# Patient Record
Sex: FEMALE | Race: WHITE | Employment: FULL TIME | URBAN - METROPOLITAN AREA
[De-identification: names, ages, dates, MRNs, and addresses within clinical notes are randomized per-mention and may not be internally consistent; named-entity substitution may affect disease eponyms.]

---

## 2017-11-19 PROBLEM — B97.7 HIGH RISK HUMAN PAPILLOMA VIRUS (HPV) INFECTION OF CERVIX: Status: ACTIVE | Noted: 2017-11-19

## 2017-11-19 PROBLEM — R87.612 LGSIL ON PAP SMEAR OF CERVIX: Status: ACTIVE | Noted: 2017-11-19

## 2017-11-19 PROBLEM — N72 HIGH RISK HUMAN PAPILLOMA VIRUS (HPV) INFECTION OF CERVIX: Status: ACTIVE | Noted: 2017-11-19

## 2023-02-15 ENCOUNTER — APPOINTMENT (OUTPATIENT)
Dept: ULTRASOUND IMAGING | Age: 28
End: 2023-02-15
Payer: COMMERCIAL

## 2023-02-15 ENCOUNTER — HOSPITAL ENCOUNTER (EMERGENCY)
Age: 28
Discharge: HOME | End: 2023-02-16
Attending: EMERGENCY MEDICINE
Payer: COMMERCIAL

## 2023-02-15 VITALS
OXYGEN SATURATION: 100 % | SYSTOLIC BLOOD PRESSURE: 123 MMHG | DIASTOLIC BLOOD PRESSURE: 71 MMHG | TEMPERATURE: 98.3 F | HEART RATE: 85 BPM

## 2023-02-15 DIAGNOSIS — O03.9 MISCARRIAGE: Primary | ICD-10-CM

## 2023-02-15 LAB
ABO/RH: NORMAL
ALBUMIN SERPL-MCNC: 4.2 G/DL (ref 3.5–5.2)
ALP BLD-CCNC: 60 U/L (ref 35–104)
ALT SERPL-CCNC: 49 U/L (ref 0–32)
ANION GAP SERPL CALCULATED.3IONS-SCNC: 11 MMOL/L (ref 7–16)
AST SERPL-CCNC: 26 U/L (ref 0–31)
BACTERIA: ABNORMAL /HPF
BASOPHILS ABSOLUTE: 0.03 E9/L (ref 0–0.2)
BASOPHILS RELATIVE PERCENT: 0.3 % (ref 0–2)
BILIRUB SERPL-MCNC: 0.3 MG/DL (ref 0–1.2)
BILIRUBIN URINE: NEGATIVE
BLOOD, URINE: ABNORMAL
BUN BLDV-MCNC: 9 MG/DL (ref 6–20)
CALCIUM SERPL-MCNC: 9.2 MG/DL (ref 8.6–10.2)
CHLORIDE BLD-SCNC: 100 MMOL/L (ref 98–107)
CLARITY: ABNORMAL
CO2: 25 MMOL/L (ref 22–29)
COLOR: ABNORMAL
CREAT SERPL-MCNC: 0.8 MG/DL (ref 0.5–1)
EOSINOPHILS ABSOLUTE: 0.19 E9/L (ref 0.05–0.5)
EOSINOPHILS RELATIVE PERCENT: 1.9 % (ref 0–6)
EPITHELIAL CELLS, UA: ABNORMAL /HPF
GFR SERPL CREATININE-BSD FRML MDRD: >60 ML/MIN/1.73
GLUCOSE BLD-MCNC: 113 MG/DL (ref 74–99)
GLUCOSE URINE: NEGATIVE MG/DL
GONADOTROPIN, CHORIONIC (HCG) QUANT: 4129 MIU/ML
HCG, URINE, POC: POSITIVE
HCT VFR BLD CALC: 42.1 % (ref 34–48)
HEMOGLOBIN: 13.4 G/DL (ref 11.5–15.5)
IMMATURE GRANULOCYTES #: 0.03 E9/L
IMMATURE GRANULOCYTES %: 0.3 % (ref 0–5)
KETONES, URINE: NEGATIVE MG/DL
LEUKOCYTE ESTERASE, URINE: NEGATIVE
LYMPHOCYTES ABSOLUTE: 1.78 E9/L (ref 1.5–4)
LYMPHOCYTES RELATIVE PERCENT: 18.1 % (ref 20–42)
Lab: ABNORMAL
MCH RBC QN AUTO: 28 PG (ref 26–35)
MCHC RBC AUTO-ENTMCNC: 31.8 % (ref 32–34.5)
MCV RBC AUTO: 88.1 FL (ref 80–99.9)
MONOCYTES ABSOLUTE: 0.58 E9/L (ref 0.1–0.95)
MONOCYTES RELATIVE PERCENT: 5.9 % (ref 2–12)
NEGATIVE QC PASS/FAIL: ABNORMAL
NEUTROPHILS ABSOLUTE: 7.2 E9/L (ref 1.8–7.3)
NEUTROPHILS RELATIVE PERCENT: 73.5 % (ref 43–80)
NITRITE, URINE: POSITIVE
PDW BLD-RTO: 12.1 FL (ref 11.5–15)
PH UA: 5 (ref 5–9)
PLATELET # BLD: 331 E9/L (ref 130–450)
PMV BLD AUTO: 9.6 FL (ref 7–12)
POSITIVE QC PASS/FAIL: ABNORMAL
POTASSIUM SERPL-SCNC: 3.8 MMOL/L (ref 3.5–5)
PROTEIN UA: 30 MG/DL
RBC # BLD: 4.78 E12/L (ref 3.5–5.5)
RBC UA: >20 /HPF (ref 0–2)
SODIUM BLD-SCNC: 136 MMOL/L (ref 132–146)
SPECIFIC GRAVITY UA: 1.02 (ref 1–1.03)
TOTAL PROTEIN: 7.1 G/DL (ref 6.4–8.3)
UROBILINOGEN, URINE: 0.2 E.U./DL
WBC # BLD: 9.8 E9/L (ref 4.5–11.5)
WBC UA: ABNORMAL /HPF (ref 0–5)

## 2023-02-15 PROCEDURE — 76817 TRANSVAGINAL US OBSTETRIC: CPT

## 2023-02-15 PROCEDURE — 86901 BLOOD TYPING SEROLOGIC RH(D): CPT

## 2023-02-15 PROCEDURE — 80053 COMPREHEN METABOLIC PANEL: CPT

## 2023-02-15 PROCEDURE — 99284 EMERGENCY DEPT VISIT MOD MDM: CPT

## 2023-02-15 PROCEDURE — 81001 URINALYSIS AUTO W/SCOPE: CPT

## 2023-02-15 PROCEDURE — 36415 COLL VENOUS BLD VENIPUNCTURE: CPT

## 2023-02-15 PROCEDURE — 84702 CHORIONIC GONADOTROPIN TEST: CPT

## 2023-02-15 PROCEDURE — 86900 BLOOD TYPING SEROLOGIC ABO: CPT

## 2023-02-15 PROCEDURE — 85025 COMPLETE CBC W/AUTO DIFF WBC: CPT

## 2023-02-15 ASSESSMENT — ENCOUNTER SYMPTOMS
VOMITING: 0
WHEEZING: 0
ABDOMINAL DISTENTION: 0
DIARRHEA: 0
EYE DISCHARGE: 0
EYE REDNESS: 0
ABDOMINAL PAIN: 1
SORE THROAT: 0
COUGH: 0
NAUSEA: 0
EYE PAIN: 0
SHORTNESS OF BREATH: 0
BACK PAIN: 0
SINUS PRESSURE: 0

## 2023-02-15 ASSESSMENT — PAIN DESCRIPTION - DESCRIPTORS: DESCRIPTORS: CRAMPING

## 2023-02-15 ASSESSMENT — PAIN DESCRIPTION - LOCATION: LOCATION: ABDOMEN;BACK

## 2023-02-15 ASSESSMENT — LIFESTYLE VARIABLES
HOW OFTEN DO YOU HAVE A DRINK CONTAINING ALCOHOL: NEVER
HOW MANY STANDARD DRINKS CONTAINING ALCOHOL DO YOU HAVE ON A TYPICAL DAY: PATIENT DOES NOT DRINK

## 2023-02-15 ASSESSMENT — PAIN - FUNCTIONAL ASSESSMENT: PAIN_FUNCTIONAL_ASSESSMENT: 0-10

## 2023-02-15 ASSESSMENT — PAIN SCALES - GENERAL: PAINLEVEL_OUTOF10: 7

## 2023-02-15 ASSESSMENT — PAIN DESCRIPTION - ORIENTATION: ORIENTATION: LOWER

## 2023-02-15 ASSESSMENT — PAIN DESCRIPTION - PAIN TYPE: TYPE: ACUTE PAIN

## 2023-02-16 NOTE — ED PROVIDER NOTES
Patient is a 32 y/o female who presents to the ED with abdominal cramping and vaginal bleeding. Patient states \"I think I had a miscarriage. She states that she had onset of vaginal bleeding approximately 3 hours prior to arrival. The bleeding has been constant. It is brown and she has passed blood clots and tissue. She did have abdominal cramping, however, this has resolved. She states that she is currently approximately 8 weeks pregnant. This is her first pregnancy. She denies any history of miscarriages. Review of Systems   Constitutional:  Negative for chills and fever. HENT:  Negative for ear pain, sinus pressure and sore throat. Eyes:  Negative for pain, discharge and redness. Respiratory:  Negative for cough, shortness of breath and wheezing. Cardiovascular:  Negative for chest pain. Gastrointestinal:  Positive for abdominal pain. Negative for abdominal distention, diarrhea, nausea and vomiting. Genitourinary:  Positive for vaginal bleeding. Negative for dysuria and frequency. Musculoskeletal:  Negative for arthralgias and back pain. Skin:  Negative for rash and wound. Neurological:  Negative for weakness and headaches. Hematological:  Negative for adenopathy. All other systems reviewed and are negative. Physical Exam  Vitals and nursing note reviewed. Constitutional:       General: She is not in acute distress. HENT:      Head: Normocephalic and atraumatic. Right Ear: External ear normal.      Left Ear: External ear normal.      Nose: Nose normal.      Mouth/Throat:      Mouth: Mucous membranes are moist.   Eyes:      Conjunctiva/sclera: Conjunctivae normal.      Pupils: Pupils are equal, round, and reactive to light. Cardiovascular:      Rate and Rhythm: Normal rate and regular rhythm. Heart sounds: No murmur heard. Pulmonary:      Effort: Pulmonary effort is normal. No respiratory distress. Breath sounds: Normal breath sounds. No stridor.  No wheezing, rhonchi or rales. Abdominal:      General: Bowel sounds are normal. There is no distension. Palpations: Abdomen is soft. Tenderness: There is no abdominal tenderness. There is no guarding. Musculoskeletal:         General: Normal range of motion. Cervical back: Normal range of motion and neck supple. Skin:     General: Skin is warm and dry. Findings: No rash. Neurological:      Mental Status: She is alert and oriented to person, place, and time. Procedures     MDM     History from : Patient    Limitations to history : None    Chronic Conditions: None    CONSULTS: (Who and What was discussed)  None    Discussion with Other Profesionals : None    Social Determinants : None    Records Reviewed : None    CC/HPI Summary, DDx, ED Course, and Reassessment: Patient is a 34 y/o female who presents to the ED with abdominal cramping and vaginal bleeding. Patient states \"I think I had a miscarriage. She states that she had onset of vaginal bleeding approximately 3 hours prior to arrival. The bleeding has been constant. It is brown and she has passed blood clots and tissue. She did have abdominal cramping, however, this has resolved. She states that she is currently approximately 8 weeks pregnant. This is her first pregnancy. She denies any history of miscarriages. Labs and transvaginal ultrasound reviewed by myself. No evidence of IUP. Will discharge for close outpatient follow up with OBGYN. Disposition Considerations (Tests not ordered but considered, Shared Decision Making, Pt Expectation of Test or Tx.): Differential diagnoses include threatened miscarriage, ectopic pregnancy and spontaneous . Appropriate for outpatient management        I am the Primary Clinician of Record.            --------------------------------------------- PAST HISTORY ---------------------------------------------  Past Medical History:  has no past medical history on file.     Past Surgical History:  has no past surgical history on file. Social History:  reports that she has never smoked. She has never used smokeless tobacco. She reports that she does not drink alcohol and does not use drugs. Family History: family history is not on file. The patients home medications have been reviewed.     Allergies: Amoxicillin    -------------------------------------------------- RESULTS -------------------------------------------------  Labs:  Results for orders placed or performed during the hospital encounter of 02/15/23   CBC with Auto Differential   Result Value Ref Range    WBC 9.8 4.5 - 11.5 E9/L    RBC 4.78 3.50 - 5.50 E12/L    Hemoglobin 13.4 11.5 - 15.5 g/dL    Hematocrit 42.1 34.0 - 48.0 %    MCV 88.1 80.0 - 99.9 fL    MCH 28.0 26.0 - 35.0 pg    MCHC 31.8 (L) 32.0 - 34.5 %    RDW 12.1 11.5 - 15.0 fL    Platelets 179 648 - 465 E9/L    MPV 9.6 7.0 - 12.0 fL    Neutrophils % 73.5 43.0 - 80.0 %    Immature Granulocytes % 0.3 0.0 - 5.0 %    Lymphocytes % 18.1 (L) 20.0 - 42.0 %    Monocytes % 5.9 2.0 - 12.0 %    Eosinophils % 1.9 0.0 - 6.0 %    Basophils % 0.3 0.0 - 2.0 %    Neutrophils Absolute 7.20 1.80 - 7.30 E9/L    Immature Granulocytes # 0.03 E9/L    Lymphocytes Absolute 1.78 1.50 - 4.00 E9/L    Monocytes Absolute 0.58 0.10 - 0.95 E9/L    Eosinophils Absolute 0.19 0.05 - 0.50 E9/L    Basophils Absolute 0.03 0.00 - 0.20 E9/L   Comprehensive Metabolic Panel   Result Value Ref Range    Sodium 136 132 - 146 mmol/L    Potassium 3.8 3.5 - 5.0 mmol/L    Chloride 100 98 - 107 mmol/L    CO2 25 22 - 29 mmol/L    Anion Gap 11 7 - 16 mmol/L    Glucose 113 (H) 74 - 99 mg/dL    BUN 9 6 - 20 mg/dL    Creatinine 0.8 0.5 - 1.0 mg/dL    Est, Glom Filt Rate >60 >=60 mL/min/1.73    Calcium 9.2 8.6 - 10.2 mg/dL    Total Protein 7.1 6.4 - 8.3 g/dL    Albumin 4.2 3.5 - 5.2 g/dL    Total Bilirubin 0.3 0.0 - 1.2 mg/dL    Alkaline Phosphatase 60 35 - 104 U/L    ALT 49 (H) 0 - 32 U/L    AST 26 0 - 31 U/L   hCG, quantitative, pregnancy   Result Value Ref Range    hCG Quant 4129.0 (H) <10 mIU/mL   Urinalysis   Result Value Ref Range    Color, UA RED (A) Straw/Yellow    Clarity, UA SLCLOUDY Clear    Glucose, Ur Negative Negative mg/dL    Bilirubin Urine Negative Negative    Ketones, Urine Negative Negative mg/dL    Specific Gravity, UA 1.020 1.005 - 1.030    Blood, Urine LARGE (A) Negative    pH, UA 5.0 5.0 - 9.0    Protein, UA 30 (A) Negative mg/dL    Urobilinogen, Urine 0.2 <2.0 E.U./dL    Nitrite, Urine POSITIVE (A) Negative    Leukocyte Esterase, Urine Negative Negative   Microscopic Urinalysis   Result Value Ref Range    WBC, UA 0-1 0 - 5 /HPF    RBC, UA >20 0 - 2 /HPF    Epithelial Cells, UA NONE SEEN /HPF    Bacteria, UA FEW (A) None Seen /HPF   POC Pregnancy Urine Qual   Result Value Ref Range    HCG, Urine, POC Positive Negative    Lot Number [id number]     Positive QC Pass/Fail Pass     Negative QC Pass/Fail Pass    ABO/RH   Result Value Ref Range    ABO/Rh B POS        Radiology:  US OB TRANSVAGINAL   Final Result   No sonographic evidence for intrauterine pregnancy or ectopic pregnancy. Echogenic contents, presumed hemorrhage, in the lower uterine segment and   cervix. The sonographic features may have association with miscarriage in   progress. ------------------------- NURSING NOTES AND VITALS REVIEWED ---------------------------  Date / Time Roomed:  2/15/2023 10:26 PM  ED Bed Assignment:  15/15    The nursing notes within the ED encounter and vital signs as below have been reviewed. /71   Pulse 85   Temp 98.3 °F (36.8 °C)   LMP 12/16/2022   SpO2 100%   Oxygen Saturation Interpretation: Normal      ------------------------------------------ PROGRESS NOTES ------------------------------------------  I have spoken with the patient and discussed todays results, in addition to providing specific details for the plan of care and counseling regarding the diagnosis and prognosis.   Their questions are answered at this time and they are agreeable with the plan. I discussed at length with them reasons for immediate return here for re evaluation. They will followup with primary care by calling their office tomorrow. --------------------------------- ADDITIONAL PROVIDER NOTES ---------------------------------  At this time the patient is without objective evidence of an acute process requiring hospitalization or inpatient management. They have remained hemodynamically stable throughout their entire ED visit and are stable for discharge with outpatient follow-up. The plan has been discussed in detail and they are aware of the specific conditions for emergent return, as well as the importance of follow-up. New Prescriptions    No medications on file       Diagnosis:  1. Miscarriage        Disposition:  Patient's disposition: Discharge to home  Patient's condition is stable.             Riaz Liriano DO  02/16/23 0011

## 2023-05-05 ENCOUNTER — TELEPHONE (OUTPATIENT)
Dept: ADMINISTRATIVE | Age: 28
End: 2023-05-05

## 2023-06-06 ENCOUNTER — OFFICE VISIT (OUTPATIENT)
Dept: CARDIOLOGY CLINIC | Age: 28
End: 2023-06-06
Payer: COMMERCIAL

## 2023-06-06 VITALS
RESPIRATION RATE: 16 BRPM | DIASTOLIC BLOOD PRESSURE: 68 MMHG | OXYGEN SATURATION: 99 % | WEIGHT: 229.4 LBS | HEART RATE: 65 BPM | BODY MASS INDEX: 34.77 KG/M2 | SYSTOLIC BLOOD PRESSURE: 122 MMHG | HEIGHT: 68 IN

## 2023-06-06 DIAGNOSIS — R00.2 PALPITATIONS: ICD-10-CM

## 2023-06-06 DIAGNOSIS — R07.2 PRECORDIAL PAIN: Primary | ICD-10-CM

## 2023-06-06 PROCEDURE — 93000 ELECTROCARDIOGRAM COMPLETE: CPT | Performed by: INTERNAL MEDICINE

## 2023-06-06 PROCEDURE — 99203 OFFICE O/P NEW LOW 30 MIN: CPT | Performed by: INTERNAL MEDICINE

## 2023-06-06 ASSESSMENT — ENCOUNTER SYMPTOMS
BLOOD IN STOOL: 0
VOMITING: 0
NAUSEA: 0
CONSTIPATION: 0
DIARRHEA: 0
COUGH: 0
BACK PAIN: 0
ABDOMINAL PAIN: 0
WHEEZING: 0
SHORTNESS OF BREATH: 0

## 2023-06-06 NOTE — PROGRESS NOTES
pain: It sounded atypical in nature. Patient has been exercising with no chest discomfort.  -Palpitations: Probably due to anxiety and excess caffeine.  -Asthma. -MTHFR mutation.  -Obesity. Patient was advised to avoid caffeinated beverages and cut down on eating chocolate. Patient was advised to continue exercising to relieve her stress. Consider echocardiogram and ZIO XT monitor if patient continues to complain of palpitations despite the above measurements. Patient was advised to lose weight. Patient may be seen in my office on a as needed basis. Thank you for allowing me to participate in your patient's care. Please feel free to contact me if you have any questions or concerns.     Michelle Muller MD [address] [facility], [address] [place] [address] Cardiology

## 2024-05-22 ENCOUNTER — HOSPITAL ENCOUNTER (INPATIENT)
Age: 29
LOS: 5 days | Discharge: HOME | End: 2024-05-29
Attending: OBSTETRICS & GYNECOLOGY | Admitting: OBSTETRICS & GYNECOLOGY
Payer: COMMERCIAL

## 2024-05-22 DIAGNOSIS — E53.8 LOW FOLATE: ICD-10-CM

## 2024-05-22 DIAGNOSIS — O36.5920 POOR FETAL GROWTH AFFECTING MANAGEMENT OF MOTHER IN SECOND TRIMESTER, SINGLE OR UNSPECIFIED FETUS: Primary | ICD-10-CM

## 2024-05-22 DIAGNOSIS — O41.02X0 OLIGOHYDRAMNIOS IN SECOND TRIMESTER, SINGLE OR UNSPECIFIED FETUS: ICD-10-CM

## 2024-05-22 DIAGNOSIS — Z15.89 COMPOUND HETEROZYGOUS MTHFR MUTATION C677T/A1298C: ICD-10-CM

## 2024-05-22 DIAGNOSIS — Z15.89 PAI-1 4G/5G GENOTYPE: ICD-10-CM

## 2024-05-22 DIAGNOSIS — Z15.89 HETEROZYGOUS MTHFR MUTATION C677T: ICD-10-CM

## 2024-05-22 DIAGNOSIS — R79.89 LOW VITAMIN D LEVEL: ICD-10-CM

## 2024-05-22 DIAGNOSIS — O16.2 ELEVATED BLOOD PRESSURE COMPLICATING PREGNANCY, ANTEPARTUM, SECOND TRIMESTER: ICD-10-CM

## 2024-05-22 DIAGNOSIS — R94.8 ABNORMAL PLACENTA FUNCTION TEST: ICD-10-CM

## 2024-05-22 DIAGNOSIS — E03.9 HYPOTHYROIDISM, UNSPECIFIED TYPE: ICD-10-CM

## 2024-05-22 DIAGNOSIS — R79.89 LOW VITAMIN B12 LEVEL: ICD-10-CM

## 2024-05-22 DIAGNOSIS — G89.18 POSTOPERATIVE PAIN: ICD-10-CM

## 2024-05-22 PROBLEM — Z3A.24 24 WEEKS GESTATION OF PREGNANCY: Status: ACTIVE | Noted: 2024-05-22

## 2024-05-22 LAB
ABO + RH BLD: NORMAL
AMNISURE, POC: NEGATIVE
ARM BAND NUMBER: NORMAL
BASOPHILS # BLD: 0.02 K/UL (ref 0–0.2)
BASOPHILS NFR BLD: 0 % (ref 0–2)
BLOOD BANK SAMPLE EXPIRATION: NORMAL
BLOOD GROUP ANTIBODIES SERPL: NEGATIVE
EOSINOPHIL # BLD: 0.14 K/UL (ref 0.05–0.5)
EOSINOPHILS RELATIVE PERCENT: 1 % (ref 0–6)
ERYTHROCYTE [DISTWIDTH] IN BLOOD BY AUTOMATED COUNT: 13.2 % (ref 11.5–15)
HCT VFR BLD AUTO: 38 % (ref 34–48)
HGB BLD-MCNC: 12.6 G/DL (ref 11.5–15.5)
IMM GRANULOCYTES # BLD AUTO: 0.2 K/UL (ref 0–0.58)
IMM GRANULOCYTES NFR BLD: 1 % (ref 0–5)
LYMPHOCYTES NFR BLD: 2.1 K/UL (ref 1.5–4)
LYMPHOCYTES RELATIVE PERCENT: 14 % (ref 20–42)
Lab: NORMAL
MCH RBC QN AUTO: 29.3 PG (ref 26–35)
MCHC RBC AUTO-ENTMCNC: 33.2 G/DL (ref 32–34.5)
MCV RBC AUTO: 88.4 FL (ref 80–99.9)
MONOCYTES NFR BLD: 0.85 K/UL (ref 0.1–0.95)
MONOCYTES NFR BLD: 6 % (ref 2–12)
NEGATIVE QC PASS/FAIL: NORMAL
NEUTROPHILS NFR BLD: 78 % (ref 43–80)
NEUTS SEG NFR BLD: 12 K/UL (ref 1.8–7.3)
PLATELET # BLD AUTO: 291 K/UL (ref 130–450)
PMV BLD AUTO: 9.9 FL (ref 7–12)
POSITIVE QC PASS/FAIL: NORMAL
RBC # BLD AUTO: 4.3 M/UL (ref 3.5–5.5)
WBC OTHER # BLD: 15.3 K/UL (ref 4.5–11.5)

## 2024-05-22 PROCEDURE — 96365 THER/PROPH/DIAG IV INF INIT: CPT

## 2024-05-22 PROCEDURE — 99232 SBSQ HOSP IP/OBS MODERATE 35: CPT | Performed by: MIDWIFE

## 2024-05-22 PROCEDURE — G0378 HOSPITAL OBSERVATION PER HR: HCPCS

## 2024-05-22 PROCEDURE — 86901 BLOOD TYPING SEROLOGIC RH(D): CPT

## 2024-05-22 PROCEDURE — 96372 THER/PROPH/DIAG INJ SC/IM: CPT

## 2024-05-22 PROCEDURE — 86850 RBC ANTIBODY SCREEN: CPT

## 2024-05-22 PROCEDURE — 85025 COMPLETE CBC W/AUTO DIFF WBC: CPT

## 2024-05-22 PROCEDURE — 84112 EVAL AMNIOTIC FLUID PROTEIN: CPT

## 2024-05-22 PROCEDURE — 86900 BLOOD TYPING SEROLOGIC ABO: CPT

## 2024-05-22 PROCEDURE — 6360000002 HC RX W HCPCS: Performed by: OBSTETRICS & GYNECOLOGY

## 2024-05-22 RX ORDER — BETAMETHASONE SODIUM PHOSPHATE AND BETAMETHASONE ACETATE 3; 3 MG/ML; MG/ML
12 INJECTION, SUSPENSION INTRA-ARTICULAR; INTRALESIONAL; INTRAMUSCULAR; SOFT TISSUE ONCE
Status: COMPLETED | OUTPATIENT
Start: 2024-05-22 | End: 2024-05-22

## 2024-05-22 RX ORDER — SODIUM CHLORIDE, SODIUM LACTATE, POTASSIUM CHLORIDE, CALCIUM CHLORIDE 600; 310; 30; 20 MG/100ML; MG/100ML; MG/100ML; MG/100ML
INJECTION, SOLUTION INTRAVENOUS CONTINUOUS
Status: DISCONTINUED | OUTPATIENT
Start: 2024-05-22 | End: 2024-05-26

## 2024-05-22 RX ORDER — CALCIUM GLUCONATE 94 MG/ML
1000 INJECTION, SOLUTION INTRAVENOUS PRN
Status: DISCONTINUED | OUTPATIENT
Start: 2024-05-22 | End: 2024-05-26

## 2024-05-22 RX ORDER — MAGNESIUM SULFATE HEPTAHYDRATE 40 MG/ML
4000 INJECTION, SOLUTION INTRAVENOUS ONCE
Status: COMPLETED | OUTPATIENT
Start: 2024-05-22 | End: 2024-05-22

## 2024-05-22 RX ADMIN — BETAMETHASONE SODIUM PHOSPHATE AND BETAMETHASONE ACETATE 12 MG: 3; 3 INJECTION, SUSPENSION INTRA-ARTICULAR; INTRALESIONAL; INTRAMUSCULAR at 22:27

## 2024-05-22 RX ADMIN — MAGNESIUM SULFATE HEPTAHYDRATE 4000 MG: 40 INJECTION, SOLUTION INTRAVENOUS at 22:28

## 2024-05-22 RX ADMIN — MAGNESIUM SULFATE HEPTAHYDRATE 2000 MG/HR: 40 INJECTION, SOLUTION INTRAVENOUS at 22:49

## 2024-05-22 NOTE — H&P
risk human papilloma virus (HPV) infection of cervix     Fetus: Reassuring  GBS: not done    PLAN:  Discussed with Dr Denton  Consult MFM  Continuous EFM    KAPIL Capellan - DEEPAK, 5/22/2024 5:30 PM

## 2024-05-23 ENCOUNTER — ANESTHESIA EVENT (OUTPATIENT)
Dept: LABOR AND DELIVERY | Age: 29
End: 2024-05-23
Payer: COMMERCIAL

## 2024-05-23 ENCOUNTER — ANESTHESIA (OUTPATIENT)
Dept: LABOR AND DELIVERY | Age: 29
End: 2024-05-23
Payer: COMMERCIAL

## 2024-05-23 ENCOUNTER — ANCILLARY PROCEDURE (OUTPATIENT)
Dept: OBGYN CLINIC | Age: 29
End: 2024-05-23
Payer: COMMERCIAL

## 2024-05-23 PROBLEM — O99.210 MATERNAL OBESITY AFFECTING PREGNANCY, ANTEPARTUM: Status: ACTIVE | Noted: 2024-05-23

## 2024-05-23 PROBLEM — R87.612 LGSIL ON PAP SMEAR OF CERVIX: Status: RESOLVED | Noted: 2017-11-19 | Resolved: 2024-05-23

## 2024-05-23 PROBLEM — O36.5920 POOR FETAL GROWTH AFFECTING MANAGEMENT OF MOTHER IN SECOND TRIMESTER: Status: ACTIVE | Noted: 2024-05-23

## 2024-05-23 PROBLEM — O36.8390 FETAL HEART RATE DECELERATIONS AFFECTING MANAGEMENT OF MOTHER: Status: ACTIVE | Noted: 2024-05-23

## 2024-05-23 PROBLEM — O41.02X0 OLIGOHYDRAMNIOS IN SECOND TRIMESTER: Status: ACTIVE | Noted: 2024-05-23

## 2024-05-23 PROBLEM — Z15.89 PAI-1 4G/5G GENOTYPE: Status: ACTIVE | Noted: 2023-02-23

## 2024-05-23 PROBLEM — D68.59 THROMBOPHILIA AFFECTING PREGNANCY IN SECOND TRIMESTER, ANTEPARTUM (HCC): Status: ACTIVE | Noted: 2024-05-23

## 2024-05-23 PROBLEM — Z15.89 MTHFR MUTATION: Status: ACTIVE | Noted: 2023-02-23

## 2024-05-23 PROBLEM — B97.7 HIGH RISK HUMAN PAPILLOMA VIRUS (HPV) INFECTION OF CERVIX: Status: RESOLVED | Noted: 2017-11-19 | Resolved: 2024-05-23

## 2024-05-23 PROBLEM — O16.2 ELEVATED BLOOD PRESSURE COMPLICATING PREGNANCY, ANTEPARTUM, SECOND TRIMESTER: Status: ACTIVE | Noted: 2024-05-23

## 2024-05-23 PROBLEM — N72 HIGH RISK HUMAN PAPILLOMA VIRUS (HPV) INFECTION OF CERVIX: Status: RESOLVED | Noted: 2017-11-19 | Resolved: 2024-05-23

## 2024-05-23 PROBLEM — O99.112 THROMBOPHILIA AFFECTING PREGNANCY IN SECOND TRIMESTER, ANTEPARTUM (HCC): Status: ACTIVE | Noted: 2024-05-23

## 2024-05-23 LAB
% FETAL BLEED: 0 %
FETAL BLEED VOLUME: 0 ML
LDH SERPL-CCNC: 185 U/L (ref 135–214)
RHOGAM DOSES REQUIRED: 0

## 2024-05-23 PROCEDURE — 6360000002 HC RX W HCPCS: Performed by: OBSTETRICS & GYNECOLOGY

## 2024-05-23 PROCEDURE — 76821 MIDDLE CEREBRAL ARTERY ECHO: CPT | Performed by: OBSTETRICS & GYNECOLOGY

## 2024-05-23 PROCEDURE — 96366 THER/PROPH/DIAG IV INF ADDON: CPT

## 2024-05-23 PROCEDURE — 85460 HEMOGLOBIN FETAL: CPT

## 2024-05-23 PROCEDURE — 76811 OB US DETAILED SNGL FETUS: CPT | Performed by: OBSTETRICS & GYNECOLOGY

## 2024-05-23 PROCEDURE — 76820 UMBILICAL ARTERY ECHO: CPT | Performed by: OBSTETRICS & GYNECOLOGY

## 2024-05-23 PROCEDURE — G0378 HOSPITAL OBSERVATION PER HR: HCPCS

## 2024-05-23 PROCEDURE — 99232 SBSQ HOSP IP/OBS MODERATE 35: CPT | Performed by: OBSTETRICS & GYNECOLOGY

## 2024-05-23 PROCEDURE — 83615 LACTATE (LD) (LDH) ENZYME: CPT

## 2024-05-23 PROCEDURE — 76819 FETAL BIOPHYS PROFIL W/O NST: CPT | Performed by: OBSTETRICS & GYNECOLOGY

## 2024-05-23 PROCEDURE — 6370000000 HC RX 637 (ALT 250 FOR IP): Performed by: OBSTETRICS & GYNECOLOGY

## 2024-05-23 PROCEDURE — 96372 THER/PROPH/DIAG INJ SC/IM: CPT

## 2024-05-23 RX ORDER — CHOLECALCIFEROL (VITAMIN D3) 50 MCG
2000 TABLET ORAL DAILY
Status: DISCONTINUED | OUTPATIENT
Start: 2024-05-23 | End: 2024-05-29 | Stop reason: HOSPADM

## 2024-05-23 RX ORDER — PRENATAL WITH FERROUS FUM AND FOLIC ACID 3080; 920; 120; 400; 22; 1.84; 3; 20; 10; 1; 12; 200; 27; 25; 2 [IU]/1; [IU]/1; MG/1; [IU]/1; MG/1; MG/1; MG/1; MG/1; MG/1; MG/1; UG/1; MG/1; MG/1; MG/1; MG/1
1 TABLET ORAL DAILY
Status: DISCONTINUED | OUTPATIENT
Start: 2024-05-23 | End: 2024-05-26

## 2024-05-23 RX ORDER — ASPIRIN 81 MG/1
81 TABLET, CHEWABLE ORAL DAILY
Status: DISCONTINUED | OUTPATIENT
Start: 2024-05-23 | End: 2024-05-26

## 2024-05-23 RX ORDER — BETAMETHASONE SODIUM PHOSPHATE AND BETAMETHASONE ACETATE 3; 3 MG/ML; MG/ML
12 INJECTION, SUSPENSION INTRA-ARTICULAR; INTRALESIONAL; INTRAMUSCULAR; SOFT TISSUE ONCE
Status: COMPLETED | OUTPATIENT
Start: 2024-05-23 | End: 2024-05-23

## 2024-05-23 RX ADMIN — ASPIRIN 81 MG CHEWABLE TABLET 81 MG: 81 TABLET CHEWABLE at 13:19

## 2024-05-23 RX ADMIN — BETAMETHASONE SODIUM PHOSPHATE AND BETAMETHASONE ACETATE 12 MG: 3; 3 INJECTION, SUSPENSION INTRA-ARTICULAR; INTRALESIONAL; INTRAMUSCULAR at 22:31

## 2024-05-23 RX ADMIN — MAGNESIUM SULFATE HEPTAHYDRATE 2000 MG/HR: 40 INJECTION, SOLUTION INTRAVENOUS at 18:22

## 2024-05-23 RX ADMIN — Medication 2000 UNITS: at 13:19

## 2024-05-23 RX ADMIN — PRENATAL WITH FERROUS FUM AND FOLIC ACID 1 TABLET: 3080; 920; 120; 400; 22; 1.84; 3; 20; 10; 1; 12; 200; 27; 25; 2 TABLET ORAL at 13:19

## 2024-05-23 NOTE — CONSULTS
of stay, discharge criteria  Resuscitation: NICU staff at delivery; possible need for resuscitation (may include but not limited to use of supplemental oxygen, respiratory support including need for intubation, risk for CPR, survival odds/morbidity/mortality  Respiratory: risk of RDS, Respiratory support: CPAP, ETT/mechanical ventilation/surfactant, risk of chronic lung disease  Infection: Risk factors for infection  Cardiovascular: risk of PDA, hypotension; possible need for pressors  FEN/GI: IVF/TPN, gavage feeds/oral feeds, risk of NEC, use of maternal breast milk/donor breast milk/formula  Heme: Possible need for blood transfusion  Bili: risk for jaundice, need for phototherapy  Neurologic: Risk of IVH, ROP/blindness  Vascular Access: need for vascular access, risks/benefits of UAC/UVC  Misc: Possible need/indications for transfer to hospital outside ; need for subspecialty evaluation    Impression:  28 y.o. female  at 24w4d with US in office showing decreased DANIEL 4.16 from previous US. Admitted for observation and MFM consultation, now with decelerations    Maternal concerns:   Patient Active Problem List   Diagnosis    LGSIL on Pap smear of cervix    High risk human papilloma virus (HPV) infection of cervix    24 weeks gestation of pregnancy     Recommendations:  I have taken this opportunity to review delivery room expectations with this mother and father. The family is aware that Neonatology will be present in the delivery room to stabilize from a cardiopulmonary standpoint. The infant will then be admitted to the  Intensive Care Unit if warranted for further evaluation and management. I reviewed with the family some of the problems associated with prematurity at approximately 24-25 weeks gestation.    We recommended and encouraged mother to express breast milk, the detailed information regarding the expression and storage of breast milk will be provided during her hospitalization.    Thank 
GYNECOLOGICAL  HISTORY:  Positive for abnormal pap smears. HPV  Positive for sexually transmitted diseases. HPV  Negative for cervical LEEP / conization /cryosurgery.    Negative for uterine surgery.   Negative for ovarian or tubal surgery.     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       Past Surgical History:   Procedure Laterality Date    WISDOM TOOTH EXTRACTION         Allergies   Allergen Reactions    Amoxicillin Hives and Swelling       Current Facility-Administered Medications:     betamethasone acetate-betamethasone sodium phosphate (CELESTONE) injection 12 mg, 12 mg, IntraMUSCular, Once, Kashmir Queen MD    aspirin chewable tablet 81 mg, 81 mg, Oral, Daily, Kashmir Queen MD, 81 mg at 05/23/24 1319    vitamin D (CHOLECALCIFEROL) tablet 2,000 Units, 2,000 Units, Oral, Daily, Kashmir Queen MD, 2,000 Units at 05/23/24 1319    folic acid-pyridoxine-cyancobalamin 2.5-25-2 mg tablet (FOLTX) 2.5-25-2 MG 2.5-25-2 mg tablet 1 tablet, 1 tablet, Oral, Daily, Kashmir Queen MD    prenatal vitamin 27-1 MG tablet 1 tablet, 1 each, Oral, Daily, Kashmir Queen MD, 1 tablet at 05/23/24 1319    lactated ringers IV soln infusion, , IntraVENous, Continuous, Bandar Denton MD    calcium gluconate 10 % injection 1,000 mg, 1,000 mg, IntraVENous, PRN, Bandar Denton MD    magnesium sulfate (76051 mg/500mL infusion), 2,000 mg/hr, IntraVENous, Continuous, Bandar Denton MD, Last Rate: 50 mL/hr at 05/23/24 0650, 2,000 mg/hr at 05/23/24 0650    Social History     Tobacco Use    Smoking status: Never     Passive exposure: Past    Smokeless tobacco: Never   Substance Use Topics    Alcohol use: Not Currently       FAMILY MEDICAL HISTORY:

## 2024-05-23 NOTE — ANESTHESIA PRE PROCEDURE
Topics    Alcohol use: Not Currently                                Counseling given: Not Answered      Vital Signs (Current):   Vitals:    05/22/24 2230 05/22/24 2250 05/22/24 2350 05/23/24 0032   BP: (!) 144/79 (!) 152/81 132/62 (!) 142/77   Pulse: 86 91 84 91   Resp:       Temp:       TempSrc:       SpO2:  96% 97%    Weight:       Height:                                                  BP Readings from Last 3 Encounters:   05/23/24 (!) 142/77   05/22/24 122/86   04/25/24 126/86       NPO Status:                                                                                 BMI:   Wt Readings from Last 3 Encounters:   05/22/24 117.5 kg (259 lb)   05/22/24 117.5 kg (259 lb)   04/25/24 114.7 kg (252 lb 12.8 oz)     Body mass index is 39.38 kg/m².    CBC:   Lab Results   Component Value Date/Time    WBC 15.3 05/22/2024 07:25 PM    RBC 4.30 05/22/2024 07:25 PM    HGB 12.6 05/22/2024 07:25 PM    HCT 38.0 05/22/2024 07:25 PM    MCV 88.4 05/22/2024 07:25 PM    RDW 13.2 05/22/2024 07:25 PM     05/22/2024 07:25 PM       CMP:   Lab Results   Component Value Date/Time     02/15/2023 10:53 PM    K 3.8 02/15/2023 10:53 PM     02/15/2023 10:53 PM    CO2 25 02/15/2023 10:53 PM    BUN 9 02/15/2023 10:53 PM    CREATININE 0.8 02/15/2023 10:53 PM    LABGLOM >60 02/15/2023 10:53 PM    GLUCOSE 113 02/15/2023 10:53 PM    CALCIUM 9.2 02/15/2023 10:53 PM    BILITOT 0.3 02/15/2023 10:53 PM    ALKPHOS 60 02/15/2023 10:53 PM    AST 26 02/15/2023 10:53 PM    ALT 49 02/15/2023 10:53 PM       POC Tests: No results for input(s): \"POCGLU\", \"POCNA\", \"POCK\", \"POCCL\", \"POCBUN\", \"POCHEMO\", \"POCHCT\" in the last 72 hours.    Coags:   Lab Results   Component Value Date/Time    APTT 32.5 02/23/2023 12:15 PM       HCG (If Applicable):   Lab Results   Component Value Date    PREGTESTUR negative 12/01/2017    HCGQUANT 46,669.0 (H) 01/19/2024        ABGs: No results found for: \"PHART\", \"PO2ART\", \"AES7VMF\", \"RFX1KVC\", \"BEART\", \"X9XBXRWJ\"

## 2024-05-24 ENCOUNTER — ANCILLARY PROCEDURE (OUTPATIENT)
Dept: OBGYN CLINIC | Age: 29
End: 2024-05-24
Payer: COMMERCIAL

## 2024-05-24 LAB
AMORPH SED URNS QL MICRO: PRESENT
AMPHET UR QL SCN: NEGATIVE
BARBITURATES UR QL SCN: NEGATIVE
BASOPHILS # BLD: 0.02 K/UL (ref 0–0.2)
BASOPHILS NFR BLD: 0 % (ref 0–2)
BENZODIAZ UR QL: NEGATIVE
BILIRUB UR QL STRIP: NEGATIVE
BUPRENORPHINE UR QL: NEGATIVE
CANNABINOIDS UR QL SCN: NEGATIVE
CLARITY UR: CLEAR
COCAINE UR QL SCN: NEGATIVE
COLOR UR: YELLOW
EOSINOPHIL # BLD: 0.04 K/UL (ref 0.05–0.5)
EOSINOPHILS RELATIVE PERCENT: 0 % (ref 0–6)
ERYTHROCYTE [DISTWIDTH] IN BLOOD BY AUTOMATED COUNT: 13.2 % (ref 11.5–15)
FENTANYL UR QL: NEGATIVE
GLUCOSE UR STRIP-MCNC: NEGATIVE MG/DL
HCT VFR BLD AUTO: 31.9 % (ref 34–48)
HGB BLD-MCNC: 10.1 G/DL (ref 11.5–15.5)
HGB UR QL STRIP.AUTO: NEGATIVE
IMM GRANULOCYTES # BLD AUTO: 0.22 K/UL (ref 0–0.58)
IMM GRANULOCYTES NFR BLD: 2 % (ref 0–5)
KETONES UR STRIP-MCNC: NEGATIVE MG/DL
LEUKOCYTE ESTERASE UR QL STRIP: NEGATIVE
LYMPHOCYTES NFR BLD: 2.2 K/UL (ref 1.5–4)
LYMPHOCYTES RELATIVE PERCENT: 16 % (ref 20–42)
MCH RBC QN AUTO: 29 PG (ref 26–35)
MCHC RBC AUTO-ENTMCNC: 31.7 G/DL (ref 32–34.5)
MCV RBC AUTO: 91.7 FL (ref 80–99.9)
METHADONE UR QL: NEGATIVE
MONOCYTES NFR BLD: 1.07 K/UL (ref 0.1–0.95)
MONOCYTES NFR BLD: 8 % (ref 2–12)
NEUTROPHILS NFR BLD: 74 % (ref 43–80)
NEUTS SEG NFR BLD: 10.04 K/UL (ref 1.8–7.3)
NITRITE UR QL STRIP: NEGATIVE
OPIATES UR QL SCN: NEGATIVE
OXYCODONE UR QL SCN: NEGATIVE
PCP UR QL SCN: NEGATIVE
PH UR STRIP: 7.5 [PH] (ref 5–9)
PLATELET # BLD AUTO: 252 K/UL (ref 130–450)
PMV BLD AUTO: 10.1 FL (ref 7–12)
PROT UR STRIP-MCNC: NEGATIVE MG/DL
RBC # BLD AUTO: 3.48 M/UL (ref 3.5–5.5)
RBC #/AREA URNS HPF: NORMAL /HPF
SP GR UR STRIP: 1.01 (ref 1–1.03)
TEST INFORMATION: NORMAL
UROBILINOGEN UR STRIP-ACNC: 0.2 EU/DL (ref 0–1)
WBC #/AREA URNS HPF: NORMAL /HPF
WBC OTHER # BLD: 13.6 K/UL (ref 4.5–11.5)

## 2024-05-24 PROCEDURE — 76820 UMBILICAL ARTERY ECHO: CPT | Performed by: OBSTETRICS & GYNECOLOGY

## 2024-05-24 PROCEDURE — 86777 TOXOPLASMA ANTIBODY: CPT

## 2024-05-24 PROCEDURE — 76815 OB US LIMITED FETUS(S): CPT | Performed by: OBSTETRICS & GYNECOLOGY

## 2024-05-24 PROCEDURE — 86146 BETA-2 GLYCOPROTEIN ANTIBODY: CPT

## 2024-05-24 PROCEDURE — 82570 ASSAY OF URINE CREATININE: CPT

## 2024-05-24 PROCEDURE — 76821 MIDDLE CEREBRAL ARTERY ECHO: CPT | Performed by: OBSTETRICS & GYNECOLOGY

## 2024-05-24 PROCEDURE — 86645 CMV ANTIBODY IGM: CPT

## 2024-05-24 PROCEDURE — 80053 COMPREHEN METABOLIC PANEL: CPT

## 2024-05-24 PROCEDURE — 83735 ASSAY OF MAGNESIUM: CPT

## 2024-05-24 PROCEDURE — 85306 CLOT INHIBIT PROT S FREE: CPT

## 2024-05-24 PROCEDURE — 84550 ASSAY OF BLOOD/URIC ACID: CPT

## 2024-05-24 PROCEDURE — 86147 CARDIOLIPIN ANTIBODY EA IG: CPT

## 2024-05-24 PROCEDURE — 84439 ASSAY OF FREE THYROXINE: CPT

## 2024-05-24 PROCEDURE — 82728 ASSAY OF FERRITIN: CPT

## 2024-05-24 PROCEDURE — 84445 ASSAY OF TSI GLOBULIN: CPT

## 2024-05-24 PROCEDURE — 84443 ASSAY THYROID STIM HORMONE: CPT

## 2024-05-24 PROCEDURE — 86696 HERPES SIMPLEX TYPE 2 TEST: CPT

## 2024-05-24 PROCEDURE — 83520 IMMUNOASSAY QUANT NOS NONAB: CPT

## 2024-05-24 PROCEDURE — 82607 VITAMIN B-12: CPT

## 2024-05-24 PROCEDURE — 86376 MICROSOMAL ANTIBODY EACH: CPT

## 2024-05-24 PROCEDURE — 84156 ASSAY OF PROTEIN URINE: CPT

## 2024-05-24 PROCEDURE — 86778 TOXOPLASMA ANTIBODY IGM: CPT

## 2024-05-24 PROCEDURE — 82746 ASSAY OF FOLIC ACID SERUM: CPT

## 2024-05-24 PROCEDURE — 83615 LACTATE (LD) (LDH) ENZYME: CPT

## 2024-05-24 PROCEDURE — 86694 HERPES SIMPLEX NES ANTBDY: CPT

## 2024-05-24 PROCEDURE — 83036 HEMOGLOBIN GLYCOSYLATED A1C: CPT

## 2024-05-24 PROCEDURE — 85025 COMPLETE CBC W/AUTO DIFF WBC: CPT

## 2024-05-24 PROCEDURE — 96366 THER/PROPH/DIAG IV INF ADDON: CPT

## 2024-05-24 PROCEDURE — 85610 PROTHROMBIN TIME: CPT

## 2024-05-24 PROCEDURE — 87086 URINE CULTURE/COLONY COUNT: CPT

## 2024-05-24 PROCEDURE — 82306 VITAMIN D 25 HYDROXY: CPT

## 2024-05-24 PROCEDURE — 81001 URINALYSIS AUTO W/SCOPE: CPT

## 2024-05-24 PROCEDURE — 85613 RUSSELL VIPER VENOM DILUTED: CPT

## 2024-05-24 PROCEDURE — 86695 HERPES SIMPLEX TYPE 1 TEST: CPT

## 2024-05-24 PROCEDURE — 83090 ASSAY OF HOMOCYSTEINE: CPT

## 2024-05-24 PROCEDURE — 86800 THYROGLOBULIN ANTIBODY: CPT

## 2024-05-24 PROCEDURE — 86747 PARVOVIRUS ANTIBODY: CPT

## 2024-05-24 PROCEDURE — 85730 THROMBOPLASTIN TIME PARTIAL: CPT

## 2024-05-24 PROCEDURE — 1220000000 HC SEMI PRIVATE OB R&B

## 2024-05-24 PROCEDURE — 81400 MOPATH PROCEDURE LEVEL 1: CPT

## 2024-05-24 PROCEDURE — 6370000000 HC RX 637 (ALT 250 FOR IP): Performed by: OBSTETRICS & GYNECOLOGY

## 2024-05-24 PROCEDURE — 80307 DRUG TEST PRSMV CHEM ANLYZR: CPT

## 2024-05-24 PROCEDURE — 86644 CMV ANTIBODY: CPT

## 2024-05-24 PROCEDURE — 2580000003 HC RX 258: Performed by: OBSTETRICS & GYNECOLOGY

## 2024-05-24 PROCEDURE — 76819 FETAL BIOPHYS PROFIL W/O NST: CPT | Performed by: OBSTETRICS & GYNECOLOGY

## 2024-05-24 RX ADMIN — PRENATAL WITH FERROUS FUM AND FOLIC ACID 1 TABLET: 3080; 920; 120; 400; 22; 1.84; 3; 20; 10; 1; 12; 200; 27; 25; 2 TABLET ORAL at 07:55

## 2024-05-24 RX ADMIN — Medication 1 TABLET: at 08:09

## 2024-05-24 RX ADMIN — SODIUM CHLORIDE, POTASSIUM CHLORIDE, SODIUM LACTATE AND CALCIUM CHLORIDE: 600; 310; 30; 20 INJECTION, SOLUTION INTRAVENOUS at 18:36

## 2024-05-24 RX ADMIN — SODIUM CHLORIDE, POTASSIUM CHLORIDE, SODIUM LACTATE AND CALCIUM CHLORIDE: 600; 310; 30; 20 INJECTION, SOLUTION INTRAVENOUS at 09:00

## 2024-05-24 RX ADMIN — ASPIRIN 81 MG CHEWABLE TABLET 81 MG: 81 TABLET CHEWABLE at 07:54

## 2024-05-24 RX ADMIN — Medication 2000 UNITS: at 07:55

## 2024-05-25 ENCOUNTER — ANCILLARY PROCEDURE (OUTPATIENT)
Dept: OBGYN CLINIC | Age: 29
End: 2024-05-25
Payer: COMMERCIAL

## 2024-05-25 LAB
25(OH)D3 SERPL-MCNC: 31.7 NG/ML (ref 30–100)
ALBUMIN SERPL-MCNC: 3.2 G/DL (ref 3.5–5.2)
ALP SERPL-CCNC: 66 U/L (ref 35–104)
ALT SERPL-CCNC: 13 U/L (ref 0–32)
ANION GAP SERPL CALCULATED.3IONS-SCNC: 11 MMOL/L (ref 7–16)
AST SERPL-CCNC: 13 U/L (ref 0–31)
BILIRUB SERPL-MCNC: <0.2 MG/DL (ref 0–1.2)
BILIRUB UR QL STRIP: NEGATIVE
BUN SERPL-MCNC: 10 MG/DL (ref 6–20)
CALCIUM SERPL-MCNC: 8.4 MG/DL (ref 8.6–10.2)
CHLORIDE SERPL-SCNC: 107 MMOL/L (ref 98–107)
CLARITY UR: CLEAR
CO2 SERPL-SCNC: 20 MMOL/L (ref 22–29)
COLOR UR: YELLOW
CREAT SERPL-MCNC: 0.6 MG/DL (ref 0.5–1)
CREAT UR-MCNC: 120.5 MG/DL (ref 29–226)
FERRITIN SERPL-MCNC: 66 NG/ML
FOLATE SERPL-MCNC: 11.7 NG/ML (ref 4.8–24.2)
GFR, ESTIMATED: >90 ML/MIN/1.73M2
GLUCOSE SERPL-MCNC: 114 MG/DL (ref 74–99)
GLUCOSE UR STRIP-MCNC: NEGATIVE MG/DL
HBA1C MFR BLD: 4.6 % (ref 4–5.6)
HCYS SERPL-SCNC: 4.9 UMOL/L (ref 0–15)
HGB UR QL STRIP.AUTO: NEGATIVE
KETONES UR STRIP-MCNC: NEGATIVE MG/DL
LDH SERPL-CCNC: 157 U/L (ref 135–214)
LEUKOCYTE ESTERASE UR QL STRIP: NEGATIVE
MAGNESIUM SERPL-MCNC: 1.7 MG/DL (ref 1.6–2.6)
NITRITE UR QL STRIP: NEGATIVE
PH UR STRIP: 6.5 [PH] (ref 5–9)
POTASSIUM SERPL-SCNC: 3.5 MMOL/L (ref 3.5–5)
PROT SERPL-MCNC: 5.7 G/DL (ref 6.4–8.3)
PROT UR STRIP-MCNC: NEGATIVE MG/DL
RBC #/AREA URNS HPF: NORMAL /HPF
SODIUM SERPL-SCNC: 138 MMOL/L (ref 132–146)
SP GR UR STRIP: 1.02 (ref 1–1.03)
T4 FREE SERPL-MCNC: 0.8 NG/DL (ref 0.9–1.7)
TOTAL PROTEIN, URINE: 8 MG/DL (ref 0–12)
TSH SERPL DL<=0.05 MIU/L-ACNC: 6.29 UIU/ML (ref 0.27–4.2)
URATE SERPL-MCNC: 5 MG/DL (ref 2.4–5.7)
URINE TOTAL PROTEIN CREATININE RATIO: 0.07 (ref 0–0.2)
UROBILINOGEN UR STRIP-ACNC: 0.2 EU/DL (ref 0–1)
VIT B12 SERPL-MCNC: 235 PG/ML (ref 211–946)
WBC #/AREA URNS HPF: NORMAL /HPF

## 2024-05-25 PROCEDURE — 76819 FETAL BIOPHYS PROFIL W/O NST: CPT | Performed by: OBSTETRICS & GYNECOLOGY

## 2024-05-25 PROCEDURE — 2580000003 HC RX 258: Performed by: OBSTETRICS & GYNECOLOGY

## 2024-05-25 PROCEDURE — 6370000000 HC RX 637 (ALT 250 FOR IP): Performed by: OBSTETRICS & GYNECOLOGY

## 2024-05-25 PROCEDURE — 76821 MIDDLE CEREBRAL ARTERY ECHO: CPT | Performed by: OBSTETRICS & GYNECOLOGY

## 2024-05-25 PROCEDURE — 76820 UMBILICAL ARTERY ECHO: CPT | Performed by: OBSTETRICS & GYNECOLOGY

## 2024-05-25 PROCEDURE — 1220000000 HC SEMI PRIVATE OB R&B

## 2024-05-25 PROCEDURE — 76999 ECHO EXAMINATION PROCEDURE: CPT | Performed by: OBSTETRICS & GYNECOLOGY

## 2024-05-25 PROCEDURE — 76815 OB US LIMITED FETUS(S): CPT | Performed by: OBSTETRICS & GYNECOLOGY

## 2024-05-25 RX ORDER — LEVOTHYROXINE SODIUM 0.03 MG/1
25 TABLET ORAL ONCE
Status: COMPLETED | OUTPATIENT
Start: 2024-05-25 | End: 2024-05-25

## 2024-05-25 RX ORDER — FOLIC ACID 1 MG/1
1 TABLET ORAL DAILY
Status: DISCONTINUED | OUTPATIENT
Start: 2024-05-25 | End: 2024-05-26

## 2024-05-25 RX ORDER — LEVOTHYROXINE SODIUM 0.05 MG/1
50 TABLET ORAL DAILY
Status: DISCONTINUED | OUTPATIENT
Start: 2024-05-25 | End: 2024-05-25

## 2024-05-25 RX ORDER — LEVOTHYROXINE SODIUM 0.07 MG/1
75 TABLET ORAL DAILY
Status: DISCONTINUED | OUTPATIENT
Start: 2024-05-26 | End: 2024-05-26

## 2024-05-25 RX ORDER — LANOLIN ALCOHOL/MO/W.PET/CERES
1000 CREAM (GRAM) TOPICAL DAILY
Status: DISCONTINUED | OUTPATIENT
Start: 2024-05-25 | End: 2024-05-29 | Stop reason: HOSPADM

## 2024-05-25 RX ADMIN — ASPIRIN 81 MG CHEWABLE TABLET 81 MG: 81 TABLET CHEWABLE at 08:52

## 2024-05-25 RX ADMIN — LEVOTHYROXINE SODIUM 25 MCG: 25 TABLET ORAL at 18:07

## 2024-05-25 RX ADMIN — LEVOTHYROXINE SODIUM 50 MCG: 0.05 TABLET ORAL at 07:39

## 2024-05-25 RX ADMIN — SODIUM CHLORIDE, POTASSIUM CHLORIDE, SODIUM LACTATE AND CALCIUM CHLORIDE: 600; 310; 30; 20 INJECTION, SOLUTION INTRAVENOUS at 18:13

## 2024-05-25 RX ADMIN — CYANOCOBALAMIN TAB 1000 MCG 1000 MCG: 1000 TAB at 18:07

## 2024-05-25 RX ADMIN — SODIUM CHLORIDE, POTASSIUM CHLORIDE, SODIUM LACTATE AND CALCIUM CHLORIDE: 600; 310; 30; 20 INJECTION, SOLUTION INTRAVENOUS at 12:06

## 2024-05-25 RX ADMIN — PRENATAL WITH FERROUS FUM AND FOLIC ACID 1 TABLET: 3080; 920; 120; 400; 22; 1.84; 3; 20; 10; 1; 12; 200; 27; 25; 2 TABLET ORAL at 08:52

## 2024-05-25 RX ADMIN — Medication 1 TABLET: at 08:52

## 2024-05-25 RX ADMIN — FOLIC ACID 1 MG: 1 TABLET ORAL at 18:06

## 2024-05-25 RX ADMIN — Medication 2000 UNITS: at 08:53

## 2024-05-26 ENCOUNTER — ANCILLARY PROCEDURE (OUTPATIENT)
Dept: OBGYN CLINIC | Age: 29
End: 2024-05-26
Payer: COMMERCIAL

## 2024-05-26 PROBLEM — R79.89 TSH ELEVATION: Status: ACTIVE | Noted: 2024-05-25

## 2024-05-26 PROBLEM — Z3A.25 25 WEEKS GESTATION OF PREGNANCY: Status: ACTIVE | Noted: 2024-05-26

## 2024-05-26 PROBLEM — R94.8 ABNORMAL PLACENTA FUNCTION TEST: Status: ACTIVE | Noted: 2024-05-26

## 2024-05-26 PROBLEM — D50.9 IRON DEFICIENCY ANEMIA OF PREGNANCY: Status: ACTIVE | Noted: 2024-05-26

## 2024-05-26 PROBLEM — O99.019 IRON DEFICIENCY ANEMIA OF PREGNANCY: Status: ACTIVE | Noted: 2024-05-26

## 2024-05-26 PROBLEM — Z3A.24 24 WEEKS GESTATION OF PREGNANCY: Status: RESOLVED | Noted: 2024-05-22 | Resolved: 2024-05-26

## 2024-05-26 LAB
ABO + RH BLD: NORMAL
ARM BAND NUMBER: NORMAL
BLOOD BANK SAMPLE EXPIRATION: NORMAL
BLOOD GROUP ANTIBODIES SERPL: NEGATIVE
ERYTHROCYTE [DISTWIDTH] IN BLOOD BY AUTOMATED COUNT: 13.4 % (ref 11.5–15)
HCT VFR BLD AUTO: 34.2 % (ref 34–48)
HGB BLD-MCNC: 11 G/DL (ref 11.5–15.5)
MCH RBC QN AUTO: 29.4 PG (ref 26–35)
MCHC RBC AUTO-ENTMCNC: 32.2 G/DL (ref 32–34.5)
MCV RBC AUTO: 91.4 FL (ref 80–99.9)
MICROORGANISM SPEC CULT: ABNORMAL
PLATELET # BLD AUTO: 247 K/UL (ref 130–450)
PMV BLD AUTO: 10 FL (ref 7–12)
RBC # BLD AUTO: 3.74 M/UL (ref 3.5–5.5)
SPECIMEN DESCRIPTION: ABNORMAL
WBC OTHER # BLD: 12.6 K/UL (ref 4.5–11.5)

## 2024-05-26 PROCEDURE — 6360000002 HC RX W HCPCS

## 2024-05-26 PROCEDURE — 3700000000 HC ANESTHESIA ATTENDED CARE: Performed by: OBSTETRICS & GYNECOLOGY

## 2024-05-26 PROCEDURE — 76999 ECHO EXAMINATION PROCEDURE: CPT | Performed by: OBSTETRICS & GYNECOLOGY

## 2024-05-26 PROCEDURE — 76821 MIDDLE CEREBRAL ARTERY ECHO: CPT | Performed by: OBSTETRICS & GYNECOLOGY

## 2024-05-26 PROCEDURE — 76819 FETAL BIOPHYS PROFIL W/O NST: CPT | Performed by: OBSTETRICS & GYNECOLOGY

## 2024-05-26 PROCEDURE — 7100000001 HC PACU RECOVERY - ADDTL 15 MIN: Performed by: OBSTETRICS & GYNECOLOGY

## 2024-05-26 PROCEDURE — 6370000000 HC RX 637 (ALT 250 FOR IP)

## 2024-05-26 PROCEDURE — 59514 CESAREAN DELIVERY ONLY: CPT | Performed by: OBSTETRICS & GYNECOLOGY

## 2024-05-26 PROCEDURE — 2580000003 HC RX 258

## 2024-05-26 PROCEDURE — 85027 COMPLETE CBC AUTOMATED: CPT

## 2024-05-26 PROCEDURE — 6360000002 HC RX W HCPCS: Performed by: OBSTETRICS & GYNECOLOGY

## 2024-05-26 PROCEDURE — 86850 RBC ANTIBODY SCREEN: CPT

## 2024-05-26 PROCEDURE — 7100000000 HC PACU RECOVERY - FIRST 15 MIN: Performed by: OBSTETRICS & GYNECOLOGY

## 2024-05-26 PROCEDURE — 76815 OB US LIMITED FETUS(S): CPT | Performed by: OBSTETRICS & GYNECOLOGY

## 2024-05-26 PROCEDURE — 6370000000 HC RX 637 (ALT 250 FOR IP): Performed by: OBSTETRICS & GYNECOLOGY

## 2024-05-26 PROCEDURE — 86901 BLOOD TYPING SEROLOGIC RH(D): CPT

## 2024-05-26 PROCEDURE — 76820 UMBILICAL ARTERY ECHO: CPT | Performed by: OBSTETRICS & GYNECOLOGY

## 2024-05-26 PROCEDURE — 2580000003 HC RX 258: Performed by: OBSTETRICS & GYNECOLOGY

## 2024-05-26 PROCEDURE — 1220000000 HC SEMI PRIVATE OB R&B

## 2024-05-26 PROCEDURE — 86900 BLOOD TYPING SEROLOGIC ABO: CPT

## 2024-05-26 PROCEDURE — 6360000002 HC RX W HCPCS: Performed by: ANESTHESIOLOGY

## 2024-05-26 PROCEDURE — 6370000000 HC RX 637 (ALT 250 FOR IP): Performed by: ANESTHESIOLOGY

## 2024-05-26 PROCEDURE — 2709999900 HC NON-CHARGEABLE SUPPLY: Performed by: OBSTETRICS & GYNECOLOGY

## 2024-05-26 PROCEDURE — 3700000001 HC ADD 15 MINUTES (ANESTHESIA): Performed by: OBSTETRICS & GYNECOLOGY

## 2024-05-26 PROCEDURE — 3609079900 HC CESAREAN SECTION: Performed by: OBSTETRICS & GYNECOLOGY

## 2024-05-26 RX ORDER — SODIUM CHLORIDE, SODIUM LACTATE, POTASSIUM CHLORIDE, CALCIUM CHLORIDE 600; 310; 30; 20 MG/100ML; MG/100ML; MG/100ML; MG/100ML
INJECTION, SOLUTION INTRAVENOUS CONTINUOUS
Status: DISCONTINUED | OUTPATIENT
Start: 2024-05-26 | End: 2024-05-29 | Stop reason: HOSPADM

## 2024-05-26 RX ORDER — ASPIRIN 81 MG/1
81 TABLET, CHEWABLE ORAL
Status: DISCONTINUED | OUTPATIENT
Start: 2024-05-27 | End: 2024-05-29 | Stop reason: HOSPADM

## 2024-05-26 RX ORDER — OXYCODONE HYDROCHLORIDE 5 MG/1
10 TABLET ORAL EVERY 4 HOURS PRN
Status: DISCONTINUED | OUTPATIENT
Start: 2024-05-27 | End: 2024-05-29 | Stop reason: HOSPADM

## 2024-05-26 RX ORDER — DOCUSATE SODIUM 100 MG/1
100 CAPSULE, LIQUID FILLED ORAL 2 TIMES DAILY
Status: DISCONTINUED | OUTPATIENT
Start: 2024-05-26 | End: 2024-05-29 | Stop reason: HOSPADM

## 2024-05-26 RX ORDER — CEFAZOLIN 2 G/1
1000 INJECTION, POWDER, FOR SOLUTION INTRAMUSCULAR; INTRAVENOUS EVERY 8 HOURS
Status: DISCONTINUED | OUTPATIENT
Start: 2024-05-26 | End: 2024-05-27 | Stop reason: CLARIF

## 2024-05-26 RX ORDER — PHENYLEPHRINE HCL IN 0.9% NACL 1 MG/10 ML
SYRINGE (ML) INTRAVENOUS PRN
Status: DISCONTINUED | OUTPATIENT
Start: 2024-05-26 | End: 2024-05-26 | Stop reason: SDUPTHER

## 2024-05-26 RX ORDER — KETOROLAC TROMETHAMINE 30 MG/ML
30 INJECTION, SOLUTION INTRAMUSCULAR; INTRAVENOUS EVERY 6 HOURS
Status: COMPLETED | OUTPATIENT
Start: 2024-05-26 | End: 2024-05-27

## 2024-05-26 RX ORDER — OXYCODONE HYDROCHLORIDE 5 MG/1
5 TABLET ORAL EVERY 4 HOURS PRN
Status: DISCONTINUED | OUTPATIENT
Start: 2024-05-27 | End: 2024-05-29 | Stop reason: HOSPADM

## 2024-05-26 RX ORDER — KETOROLAC TROMETHAMINE 30 MG/ML
30 INJECTION, SOLUTION INTRAMUSCULAR; INTRAVENOUS EVERY 6 HOURS
Status: DISPENSED | OUTPATIENT
Start: 2024-05-26 | End: 2024-05-27

## 2024-05-26 RX ORDER — OXYCODONE HYDROCHLORIDE 5 MG/1
5 TABLET ORAL EVERY 4 HOURS PRN
Status: DISPENSED | OUTPATIENT
Start: 2024-05-26 | End: 2024-05-27

## 2024-05-26 RX ORDER — MORPHINE SULFATE 1 MG/ML
INJECTION, SOLUTION EPIDURAL; INTRATHECAL; INTRAVENOUS PRN
Status: DISCONTINUED | OUTPATIENT
Start: 2024-05-26 | End: 2024-05-26 | Stop reason: SDUPTHER

## 2024-05-26 RX ORDER — MORPHINE SULFATE 2 MG/ML
1 INJECTION, SOLUTION INTRAMUSCULAR; INTRAVENOUS EVERY 6 HOURS PRN
Status: ACTIVE | OUTPATIENT
Start: 2024-05-26 | End: 2024-05-27

## 2024-05-26 RX ORDER — ACETAMINOPHEN 500 MG
1000 TABLET ORAL EVERY 8 HOURS SCHEDULED
Status: DISCONTINUED | OUTPATIENT
Start: 2024-05-27 | End: 2024-05-29 | Stop reason: HOSPADM

## 2024-05-26 RX ORDER — CITRIC ACID/SODIUM CITRATE 334-500MG
SOLUTION, ORAL ORAL
Status: COMPLETED
Start: 2024-05-26 | End: 2024-05-26

## 2024-05-26 RX ORDER — WATER 10 ML/10ML
INJECTION INTRAMUSCULAR; INTRAVENOUS; SUBCUTANEOUS
Status: DISPENSED
Start: 2024-05-26 | End: 2024-05-27

## 2024-05-26 RX ORDER — IBUPROFEN 600 MG/1
600 TABLET ORAL EVERY 6 HOURS PRN
Status: DISCONTINUED | OUTPATIENT
Start: 2024-05-27 | End: 2024-05-29 | Stop reason: HOSPADM

## 2024-05-26 RX ORDER — ONDANSETRON 2 MG/ML
INJECTION INTRAMUSCULAR; INTRAVENOUS PRN
Status: DISCONTINUED | OUTPATIENT
Start: 2024-05-26 | End: 2024-05-26 | Stop reason: SDUPTHER

## 2024-05-26 RX ORDER — WATER 10 ML/10ML
INJECTION INTRAMUSCULAR; INTRAVENOUS; SUBCUTANEOUS
Status: COMPLETED
Start: 2024-05-26 | End: 2024-05-26

## 2024-05-26 RX ORDER — LEVOTHYROXINE SODIUM 0.07 MG/1
75 TABLET ORAL
Status: DISCONTINUED | OUTPATIENT
Start: 2024-05-27 | End: 2024-05-29 | Stop reason: HOSPADM

## 2024-05-26 RX ORDER — NALOXONE HYDROCHLORIDE 0.4 MG/ML
INJECTION, SOLUTION INTRAMUSCULAR; INTRAVENOUS; SUBCUTANEOUS PRN
Status: ACTIVE | OUTPATIENT
Start: 2024-05-26 | End: 2024-05-27

## 2024-05-26 RX ORDER — SODIUM CHLORIDE 0.9 % (FLUSH) 0.9 %
5-40 SYRINGE (ML) INJECTION EVERY 12 HOURS SCHEDULED
Status: DISCONTINUED | OUTPATIENT
Start: 2024-05-26 | End: 2024-05-29 | Stop reason: HOSPADM

## 2024-05-26 RX ORDER — SODIUM CHLORIDE, SODIUM LACTATE, POTASSIUM CHLORIDE, AND CALCIUM CHLORIDE .6; .31; .03; .02 G/100ML; G/100ML; G/100ML; G/100ML
1000 INJECTION, SOLUTION INTRAVENOUS ONCE
Status: DISCONTINUED | OUTPATIENT
Start: 2024-05-26 | End: 2024-05-26

## 2024-05-26 RX ORDER — KETOROLAC TROMETHAMINE 30 MG/ML
INJECTION, SOLUTION INTRAMUSCULAR; INTRAVENOUS PRN
Status: DISCONTINUED | OUTPATIENT
Start: 2024-05-26 | End: 2024-05-26 | Stop reason: SDUPTHER

## 2024-05-26 RX ORDER — ACETAMINOPHEN 325 MG/1
650 TABLET ORAL EVERY 6 HOURS
Status: DISPENSED | OUTPATIENT
Start: 2024-05-26 | End: 2024-05-27

## 2024-05-26 RX ORDER — FOLIC ACID 1 MG/1
1 TABLET ORAL
Status: DISCONTINUED | OUTPATIENT
Start: 2024-05-27 | End: 2024-05-29 | Stop reason: HOSPADM

## 2024-05-26 RX ORDER — SODIUM CHLORIDE 0.9 % (FLUSH) 0.9 %
5-40 SYRINGE (ML) INJECTION PRN
Status: DISCONTINUED | OUTPATIENT
Start: 2024-05-26 | End: 2024-05-29 | Stop reason: HOSPADM

## 2024-05-26 RX ORDER — CITRIC ACID/SODIUM CITRATE 334-500MG
30 SOLUTION, ORAL ORAL ONCE
Status: COMPLETED | OUTPATIENT
Start: 2024-05-26 | End: 2024-05-26

## 2024-05-26 RX ORDER — BISACODYL 10 MG
10 SUPPOSITORY, RECTAL RECTAL DAILY PRN
Status: DISCONTINUED | OUTPATIENT
Start: 2024-05-28 | End: 2024-05-29 | Stop reason: HOSPADM

## 2024-05-26 RX ORDER — SODIUM CHLORIDE, SODIUM LACTATE, POTASSIUM CHLORIDE, CALCIUM CHLORIDE 600; 310; 30; 20 MG/100ML; MG/100ML; MG/100ML; MG/100ML
INJECTION, SOLUTION INTRAVENOUS CONTINUOUS PRN
Status: DISCONTINUED | OUTPATIENT
Start: 2024-05-26 | End: 2024-05-26 | Stop reason: SDUPTHER

## 2024-05-26 RX ORDER — NALBUPHINE HYDROCHLORIDE 10 MG/ML
5 INJECTION, SOLUTION INTRAMUSCULAR; INTRAVENOUS; SUBCUTANEOUS EVERY 4 HOURS PRN
Status: DISCONTINUED | OUTPATIENT
Start: 2024-05-26 | End: 2024-05-26 | Stop reason: RX

## 2024-05-26 RX ORDER — BUPIVACAINE HYDROCHLORIDE 7.5 MG/ML
INJECTION, SOLUTION INTRASPINAL PRN
Status: DISCONTINUED | OUTPATIENT
Start: 2024-05-26 | End: 2024-05-26 | Stop reason: SDUPTHER

## 2024-05-26 RX ORDER — CEFAZOLIN 2 G/1
INJECTION, POWDER, FOR SOLUTION INTRAMUSCULAR; INTRAVENOUS
Status: COMPLETED
Start: 2024-05-26 | End: 2024-05-26

## 2024-05-26 RX ORDER — PRENATAL WITH FERROUS FUM AND FOLIC ACID 3080; 920; 120; 400; 22; 1.84; 3; 20; 10; 1; 12; 200; 27; 25; 2 [IU]/1; [IU]/1; MG/1; [IU]/1; MG/1; MG/1; MG/1; MG/1; MG/1; MG/1; UG/1; MG/1; MG/1; MG/1; MG/1
1 TABLET ORAL
Status: DISCONTINUED | OUTPATIENT
Start: 2024-05-27 | End: 2024-05-29 | Stop reason: HOSPADM

## 2024-05-26 RX ORDER — SIMETHICONE 80 MG
80 TABLET,CHEWABLE ORAL EVERY 6 HOURS PRN
Status: DISCONTINUED | OUTPATIENT
Start: 2024-05-26 | End: 2024-05-29 | Stop reason: HOSPADM

## 2024-05-26 RX ORDER — ONDANSETRON 2 MG/ML
4 INJECTION INTRAMUSCULAR; INTRAVENOUS EVERY 6 HOURS PRN
Status: DISPENSED | OUTPATIENT
Start: 2024-05-26 | End: 2024-05-27

## 2024-05-26 RX ORDER — ONDANSETRON 2 MG/ML
4 INJECTION INTRAMUSCULAR; INTRAVENOUS EVERY 6 HOURS PRN
Status: DISCONTINUED | OUTPATIENT
Start: 2024-05-26 | End: 2024-05-29 | Stop reason: HOSPADM

## 2024-05-26 RX ORDER — MODIFIED LANOLIN
OINTMENT (GRAM) TOPICAL
Status: DISCONTINUED | OUTPATIENT
Start: 2024-05-26 | End: 2024-05-29 | Stop reason: HOSPADM

## 2024-05-26 RX ORDER — FERROUS SULFATE 325(65) MG
325 TABLET ORAL 2 TIMES DAILY WITH MEALS
Status: DISCONTINUED | OUTPATIENT
Start: 2024-05-26 | End: 2024-05-29 | Stop reason: HOSPADM

## 2024-05-26 RX ORDER — SODIUM CHLORIDE 9 MG/ML
INJECTION, SOLUTION INTRAVENOUS PRN
Status: DISCONTINUED | OUTPATIENT
Start: 2024-05-26 | End: 2024-05-29 | Stop reason: HOSPADM

## 2024-05-26 RX ORDER — MORPHINE SULFATE 2 MG/ML
2 INJECTION, SOLUTION INTRAMUSCULAR; INTRAVENOUS EVERY 6 HOURS PRN
Status: ACTIVE | OUTPATIENT
Start: 2024-05-26 | End: 2024-05-27

## 2024-05-26 RX ADMIN — CYANOCOBALAMIN TAB 1000 MCG 1000 MCG: 1000 TAB at 09:10

## 2024-05-26 RX ADMIN — FOLIC ACID 1 MG: 1 TABLET ORAL at 09:10

## 2024-05-26 RX ADMIN — Medication 100 MCG: at 11:22

## 2024-05-26 RX ADMIN — KETOROLAC TROMETHAMINE 30 MG: 30 INJECTION, SOLUTION INTRAMUSCULAR at 11:55

## 2024-05-26 RX ADMIN — ONDANSETRON 4 MG: 2 INJECTION INTRAMUSCULAR; INTRAVENOUS at 20:53

## 2024-05-26 RX ADMIN — BUPIVACAINE HYDROCHLORIDE 1.6 ML: 7.5 INJECTION, SOLUTION SUBARACHNOID at 10:55

## 2024-05-26 RX ADMIN — ACETAMINOPHEN 650 MG: 325 TABLET ORAL at 13:56

## 2024-05-26 RX ADMIN — ASPIRIN 81 MG CHEWABLE TABLET 81 MG: 81 TABLET CHEWABLE at 09:10

## 2024-05-26 RX ADMIN — WATER 20 ML: 1 INJECTION INTRAMUSCULAR; INTRAVENOUS; SUBCUTANEOUS at 10:07

## 2024-05-26 RX ADMIN — SODIUM CHLORIDE, POTASSIUM CHLORIDE, SODIUM LACTATE AND CALCIUM CHLORIDE: 600; 310; 30; 20 INJECTION, SOLUTION INTRAVENOUS at 11:58

## 2024-05-26 RX ADMIN — WATER 2000 MG: 1 INJECTION INTRAMUSCULAR; INTRAVENOUS; SUBCUTANEOUS at 10:45

## 2024-05-26 RX ADMIN — MORPHINE SULFATE 0.15 MG: 1 INJECTION, SOLUTION EPIDURAL; INTRATHECAL; INTRAVENOUS at 10:55

## 2024-05-26 RX ADMIN — Medication 909 ML/HR: at 11:15

## 2024-05-26 RX ADMIN — SODIUM CITRATE AND CITRIC ACID MONOHYDRATE 30 ML: 500; 334 SOLUTION ORAL at 10:07

## 2024-05-26 RX ADMIN — ACETAMINOPHEN 650 MG: 325 TABLET ORAL at 21:50

## 2024-05-26 RX ADMIN — CEFAZOLIN 1000 MG: 2 INJECTION, POWDER, FOR SOLUTION INTRAMUSCULAR; INTRAVENOUS at 18:14

## 2024-05-26 RX ADMIN — Medication 100 MCG: at 11:13

## 2024-05-26 RX ADMIN — SODIUM CHLORIDE, POTASSIUM CHLORIDE, SODIUM LACTATE AND CALCIUM CHLORIDE: 600; 310; 30; 20 INJECTION, SOLUTION INTRAVENOUS at 10:49

## 2024-05-26 RX ADMIN — KETOROLAC TROMETHAMINE 30 MG: 30 INJECTION INTRAMUSCULAR; INTRAVENOUS at 18:05

## 2024-05-26 RX ADMIN — PRENATAL WITH FERROUS FUM AND FOLIC ACID 1 TABLET: 3080; 920; 120; 400; 22; 1.84; 3; 20; 10; 1; 12; 200; 27; 25; 2 TABLET ORAL at 09:10

## 2024-05-26 RX ADMIN — Medication 1 TABLET: at 09:10

## 2024-05-26 RX ADMIN — Medication 2000 UNITS: at 09:10

## 2024-05-26 RX ADMIN — LEVOTHYROXINE SODIUM 75 MCG: 75 TABLET ORAL at 07:50

## 2024-05-26 RX ADMIN — Medication 30 ML: at 10:07

## 2024-05-26 RX ADMIN — DOCUSATE SODIUM 100 MG: 100 CAPSULE, LIQUID FILLED ORAL at 20:07

## 2024-05-26 RX ADMIN — CEFAZOLIN 2000 MG: 2 INJECTION, POWDER, FOR SOLUTION INTRAMUSCULAR; INTRAVENOUS at 10:07

## 2024-05-26 RX ADMIN — SIMETHICONE 80 MG: 80 TABLET, CHEWABLE ORAL at 18:13

## 2024-05-26 RX ADMIN — ONDANSETRON 8 MG: 2 INJECTION INTRAMUSCULAR; INTRAVENOUS at 10:44

## 2024-05-26 ASSESSMENT — PAIN DESCRIPTION - DESCRIPTORS
DESCRIPTORS: SORE;TENDER
DESCRIPTORS: ACHING;SORE
DESCRIPTORS: ACHING;CRAMPING;DISCOMFORT;BURNING

## 2024-05-26 ASSESSMENT — PAIN SCALES - GENERAL
PAINLEVEL_OUTOF10: 6
PAINLEVEL_OUTOF10: 5
PAINLEVEL_OUTOF10: 4

## 2024-05-26 ASSESSMENT — PAIN DESCRIPTION - ORIENTATION
ORIENTATION: LOWER
ORIENTATION: LOWER

## 2024-05-26 ASSESSMENT — PAIN - FUNCTIONAL ASSESSMENT: PAIN_FUNCTIONAL_ASSESSMENT: ACTIVITIES ARE NOT PREVENTED

## 2024-05-26 ASSESSMENT — PAIN DESCRIPTION - LOCATION
LOCATION: ABDOMEN
LOCATION: ABDOMEN;INCISION
LOCATION: ABDOMEN

## 2024-05-26 NOTE — LACTATION NOTE
First time mom. Set up Symphony breast pump with Primo-Lacto Colostrum Collection System at bedside for mother of  very premature infant admitted to NICU.  Instructed on pump set use.  Instructed to pump every 2- 3 hrs for 15-20 min with hand massage for effective removal of colostrum.  Reviewed milk storage and pump cleaning guidelines-gave printed information.  Has bottles, labels, swabs.  Instructed on timing and dating labels. Mom is requesting a double electric breast pump for home use.  Encouraged to call with needs or concerns.

## 2024-05-26 NOTE — OP NOTE
First Assistant: Kashmir Winter MD    The use of a first assistant surgeon was necessary because there was no qualified resident surgeon available.     Kashmir Winter MD  assisted in the proper positioning, prepping, and draping of the patient, intraoperative retraction and suctioning for visualization, passing sutures and suture management.      Kashmir Winter MD  5/26/2024    
Kashmir WYLIE MD    Assistant:  Kashmir Winston MD    Anesthesia: Spinal with Duramorph    Complications: None    Estimated Blood Loss (mL): 350    Fluids Replaced (mL): 1000 crystalloid    Urine Output (mL): Surgical, clear    Drains: Keita to gravity    Intraoperative Medications: Ancef 2 gms at induction, IV pitocin drip post placenta    Indication: 28-year-old W female  2 para 0-0-1-0 with a history of spontaneous  in 2023, subsequently diagnosed with a mild complex thrombophilia (RAMÓN-1 4G/5G heterozygote, MTHFR 677 heterozygote) on LD ASA, PNV and FA daily presented to the office May 22, 2024 for routine prenatal visit and an ultrasound to complete documentation the four-chamber view of the heart and fetal profile not seen at the midtrimester fetal anatomy scan.  The amniotic fluid was incidentally noted to be visually decreased, with the DANIEL measuring at 4.16 cm as compared to the prior anatomy scan.  Patient was sent to labor delivery for MFM consultation which confirmed the oligohydramnios as well as identified IUGR -the biparietal diameter was less than the 1st growth percentile and the abdominal circumference the 4th growth percentile for gestational age.  Variable and intermittent spontaneous prolonged fetal heart decelerations were noted.  The biophysical profile and MCA Doppler assessments were reassuring and the patient was admitted for magnesium sulfate for neuroprotection, Celestone to accelerate fetal organ maturity and Neonatology consultation.  The patient was incidentally diagnosed with hypothyroidism and started on Synthroid 75 mcg daily.  During the hospital stay, the episodes of heart rate decelerations progressively increased in frequency and severity though Doppler studies remained reassuring; however,  on the fourth hospital day there was absent and reversal of end-diastolic flow on the cord Doppler assessments.    Findings:  Infection Present At Time Of Surgery

## 2024-05-27 LAB
ERYTHROCYTE [DISTWIDTH] IN BLOOD BY AUTOMATED COUNT: 13.4 % (ref 11.5–15)
HCT VFR BLD AUTO: 34.8 % (ref 34–48)
HGB BLD-MCNC: 10.9 G/DL (ref 11.5–15.5)
HSV1 GG IGG SER-ACNC: 33.3 IV
HSV2 GG IGG SER-ACNC: 0.08 IV
MCH RBC QN AUTO: 29.1 PG (ref 26–35)
MCHC RBC AUTO-ENTMCNC: 31.3 G/DL (ref 32–34.5)
MCV RBC AUTO: 92.8 FL (ref 80–99.9)
PLATELET # BLD AUTO: 254 K/UL (ref 130–450)
PMV BLD AUTO: 10 FL (ref 7–12)
RBC # BLD AUTO: 3.75 M/UL (ref 3.5–5.5)
WBC OTHER # BLD: 14.7 K/UL (ref 4.5–11.5)

## 2024-05-27 PROCEDURE — 6360000002 HC RX W HCPCS: Performed by: OBSTETRICS & GYNECOLOGY

## 2024-05-27 PROCEDURE — 2500000003 HC RX 250 WO HCPCS: Performed by: OBSTETRICS & GYNECOLOGY

## 2024-05-27 PROCEDURE — 6370000000 HC RX 637 (ALT 250 FOR IP): Performed by: ANESTHESIOLOGY

## 2024-05-27 PROCEDURE — 1220000000 HC SEMI PRIVATE OB R&B

## 2024-05-27 PROCEDURE — 6370000000 HC RX 637 (ALT 250 FOR IP): Performed by: OBSTETRICS & GYNECOLOGY

## 2024-05-27 PROCEDURE — 85027 COMPLETE CBC AUTOMATED: CPT

## 2024-05-27 PROCEDURE — A4216 STERILE WATER/SALINE, 10 ML: HCPCS | Performed by: OBSTETRICS & GYNECOLOGY

## 2024-05-27 PROCEDURE — 2580000003 HC RX 258: Performed by: OBSTETRICS & GYNECOLOGY

## 2024-05-27 PROCEDURE — 36415 COLL VENOUS BLD VENIPUNCTURE: CPT

## 2024-05-27 RX ORDER — SODIUM CHLORIDE 9 MG/ML
10 INJECTION, SOLUTION INTRAMUSCULAR; INTRAVENOUS; SUBCUTANEOUS ONCE
Status: DISCONTINUED | OUTPATIENT
Start: 2024-05-27 | End: 2024-05-29 | Stop reason: HOSPADM

## 2024-05-27 RX ORDER — CEFAZOLIN SODIUM 1 G/3ML
1000 INJECTION, POWDER, FOR SOLUTION INTRAMUSCULAR; INTRAVENOUS ONCE
Status: COMPLETED | OUTPATIENT
Start: 2024-05-27 | End: 2024-05-27

## 2024-05-27 RX ORDER — CEFAZOLIN 2 G/1
1000 INJECTION, POWDER, FOR SOLUTION INTRAMUSCULAR; INTRAVENOUS ONCE
Status: DISCONTINUED | OUTPATIENT
Start: 2024-05-27 | End: 2024-05-27 | Stop reason: CLARIF

## 2024-05-27 RX ADMIN — DOCUSATE SODIUM 100 MG: 100 CAPSULE, LIQUID FILLED ORAL at 07:56

## 2024-05-27 RX ADMIN — ASPIRIN 81 MG CHEWABLE TABLET 81 MG: 81 TABLET CHEWABLE at 08:07

## 2024-05-27 RX ADMIN — SIMETHICONE 80 MG: 80 TABLET, CHEWABLE ORAL at 07:57

## 2024-05-27 RX ADMIN — PRENATAL WITH FERROUS FUM AND FOLIC ACID 1 TABLET: 3080; 920; 120; 400; 22; 1.84; 3; 20; 10; 1; 12; 200; 27; 25; 2 TABLET ORAL at 07:56

## 2024-05-27 RX ADMIN — KETOROLAC TROMETHAMINE 30 MG: 30 INJECTION INTRAMUSCULAR; INTRAVENOUS at 12:32

## 2024-05-27 RX ADMIN — DOCUSATE SODIUM 100 MG: 100 CAPSULE, LIQUID FILLED ORAL at 20:28

## 2024-05-27 RX ADMIN — FAMOTIDINE 20 MG: 10 INJECTION, SOLUTION INTRAVENOUS at 07:58

## 2024-05-27 RX ADMIN — KETOROLAC TROMETHAMINE 30 MG: 30 INJECTION INTRAMUSCULAR; INTRAVENOUS at 00:03

## 2024-05-27 RX ADMIN — OXYCODONE HYDROCHLORIDE 5 MG: 5 TABLET ORAL at 14:28

## 2024-05-27 RX ADMIN — IBUPROFEN 600 MG: 600 TABLET, FILM COATED ORAL at 22:52

## 2024-05-27 RX ADMIN — LEVOTHYROXINE SODIUM 75 MCG: 75 TABLET ORAL at 05:45

## 2024-05-27 RX ADMIN — CEFAZOLIN 1000 MG: 1 INJECTION, POWDER, FOR SOLUTION INTRAMUSCULAR; INTRAVENOUS at 03:08

## 2024-05-27 RX ADMIN — ACETAMINOPHEN 1000 MG: 500 TABLET ORAL at 20:28

## 2024-05-27 RX ADMIN — OXYCODONE HYDROCHLORIDE 5 MG: 5 TABLET ORAL at 20:32

## 2024-05-27 RX ADMIN — KETOROLAC TROMETHAMINE 30 MG: 30 INJECTION INTRAMUSCULAR; INTRAVENOUS at 05:44

## 2024-05-27 RX ADMIN — ACETAMINOPHEN 1000 MG: 500 TABLET ORAL at 08:07

## 2024-05-27 RX ADMIN — Medication 2000 UNITS: at 07:57

## 2024-05-27 RX ADMIN — FOLIC ACID 1 MG: 1 TABLET ORAL at 07:56

## 2024-05-27 ASSESSMENT — PAIN DESCRIPTION - LOCATION
LOCATION: ABDOMEN
LOCATION: ABDOMEN;INCISION
LOCATION: ABDOMEN
LOCATION: ABDOMEN
LOCATION: ABDOMEN;INCISION
LOCATION: ABDOMEN;INCISION

## 2024-05-27 ASSESSMENT — PAIN DESCRIPTION - DESCRIPTORS
DESCRIPTORS: ACHING;DISCOMFORT;CRAMPING
DESCRIPTORS: ACHING
DESCRIPTORS: ACHING;DISCOMFORT;CRAMPING
DESCRIPTORS: ACHING
DESCRIPTORS: SORE;TENDER
DESCRIPTORS: SORE;TENDER
DESCRIPTORS: ACHING;SORE;TENDER
DESCRIPTORS: ACHING

## 2024-05-27 ASSESSMENT — PAIN SCALES - GENERAL
PAINLEVEL_OUTOF10: 6
PAINLEVEL_OUTOF10: 8
PAINLEVEL_OUTOF10: 3
PAINLEVEL_OUTOF10: 5
PAINLEVEL_OUTOF10: 1

## 2024-05-27 ASSESSMENT — PAIN DESCRIPTION - ORIENTATION
ORIENTATION: LOWER

## 2024-05-27 ASSESSMENT — PAIN - FUNCTIONAL ASSESSMENT
PAIN_FUNCTIONAL_ASSESSMENT: ACTIVITIES ARE NOT PREVENTED

## 2024-05-28 PROBLEM — D62 POSTOPERATIVE ANEMIA DUE TO ACUTE BLOOD LOSS: Status: ACTIVE | Noted: 2024-05-28

## 2024-05-28 LAB
HSV I/II AB, IGM: 0.34 IV
HSV I/II IGG: >22.4 IV
PARVOVIRUS B19 IGG ANTIBODY: 5.81 IV
PARVOVIRUS B19 IGM ANTIBODY: 0.15 IV

## 2024-05-28 PROCEDURE — 6370000000 HC RX 637 (ALT 250 FOR IP): Performed by: OBSTETRICS & GYNECOLOGY

## 2024-05-28 PROCEDURE — 1220000000 HC SEMI PRIVATE OB R&B

## 2024-05-28 RX ADMIN — Medication 1 TABLET: at 09:32

## 2024-05-28 RX ADMIN — Medication 2000 UNITS: at 09:32

## 2024-05-28 RX ADMIN — ACETAMINOPHEN 1000 MG: 500 TABLET ORAL at 05:16

## 2024-05-28 RX ADMIN — IBUPROFEN 600 MG: 600 TABLET, FILM COATED ORAL at 13:19

## 2024-05-28 RX ADMIN — OXYCODONE HYDROCHLORIDE 5 MG: 5 TABLET ORAL at 14:55

## 2024-05-28 RX ADMIN — ASPIRIN 81 MG CHEWABLE TABLET 81 MG: 81 TABLET CHEWABLE at 09:32

## 2024-05-28 RX ADMIN — PRENATAL WITH FERROUS FUM AND FOLIC ACID 1 TABLET: 3080; 920; 120; 400; 22; 1.84; 3; 20; 10; 1; 12; 200; 27; 25; 2 TABLET ORAL at 09:32

## 2024-05-28 RX ADMIN — CYANOCOBALAMIN TAB 1000 MCG 1000 MCG: 1000 TAB at 09:32

## 2024-05-28 RX ADMIN — ACETAMINOPHEN 1000 MG: 500 TABLET ORAL at 14:55

## 2024-05-28 RX ADMIN — LEVOTHYROXINE SODIUM 75 MCG: 75 TABLET ORAL at 05:16

## 2024-05-28 RX ADMIN — OXYCODONE HYDROCHLORIDE 5 MG: 5 TABLET ORAL at 05:16

## 2024-05-28 RX ADMIN — FOLIC ACID 1 MG: 1 TABLET ORAL at 09:32

## 2024-05-28 RX ADMIN — IBUPROFEN 600 MG: 600 TABLET, FILM COATED ORAL at 21:57

## 2024-05-28 RX ADMIN — OXYCODONE HYDROCHLORIDE 5 MG: 5 TABLET ORAL at 21:57

## 2024-05-28 RX ADMIN — DOCUSATE SODIUM 100 MG: 100 CAPSULE, LIQUID FILLED ORAL at 21:57

## 2024-05-28 ASSESSMENT — PAIN DESCRIPTION - ORIENTATION
ORIENTATION: LOWER

## 2024-05-28 ASSESSMENT — PAIN - FUNCTIONAL ASSESSMENT
PAIN_FUNCTIONAL_ASSESSMENT: ACTIVITIES ARE NOT PREVENTED

## 2024-05-28 ASSESSMENT — PAIN DESCRIPTION - DESCRIPTORS
DESCRIPTORS: ACHING
DESCRIPTORS: DISCOMFORT;TENDER;SORE
DESCRIPTORS: ACHING
DESCRIPTORS: ACHING

## 2024-05-28 ASSESSMENT — PAIN DESCRIPTION - LOCATION
LOCATION: ABDOMEN;INCISION
LOCATION: INCISION
LOCATION: ABDOMEN;INCISION
LOCATION: ABDOMEN

## 2024-05-28 ASSESSMENT — PAIN SCALES - GENERAL
PAINLEVEL_OUTOF10: 4
PAINLEVEL_OUTOF10: 7
PAINLEVEL_OUTOF10: 6
PAINLEVEL_OUTOF10: 5

## 2024-05-28 NOTE — LACTATION NOTE
Mom getting swabs when she pumps, discussed normal milk production. Encouraged mom to maintain pumping routine to stimulate and establish supply. Support provided and encouraged to call with any needs.

## 2024-05-29 VITALS
RESPIRATION RATE: 16 BRPM | HEIGHT: 68 IN | WEIGHT: 259 LBS | DIASTOLIC BLOOD PRESSURE: 70 MMHG | HEART RATE: 79 BPM | SYSTOLIC BLOOD PRESSURE: 138 MMHG | TEMPERATURE: 98.7 F | OXYGEN SATURATION: 96 % | BODY MASS INDEX: 39.25 KG/M2

## 2024-05-29 PROBLEM — O41.02X0 OLIGOHYDRAMNIOS IN SECOND TRIMESTER: Status: RESOLVED | Noted: 2024-05-23 | Resolved: 2024-05-29

## 2024-05-29 PROBLEM — O36.5920 POOR FETAL GROWTH AFFECTING MANAGEMENT OF MOTHER IN SECOND TRIMESTER: Status: RESOLVED | Noted: 2024-05-23 | Resolved: 2024-05-29

## 2024-05-29 PROBLEM — R94.8 ABNORMAL PLACENTA FUNCTION TEST: Status: RESOLVED | Noted: 2024-05-26 | Resolved: 2024-05-29

## 2024-05-29 PROBLEM — O99.112 THROMBOPHILIA AFFECTING PREGNANCY IN SECOND TRIMESTER, ANTEPARTUM (HCC): Status: RESOLVED | Noted: 2024-05-23 | Resolved: 2024-05-29

## 2024-05-29 PROBLEM — O99.210 MATERNAL OBESITY AFFECTING PREGNANCY, ANTEPARTUM: Status: RESOLVED | Noted: 2024-05-23 | Resolved: 2024-05-29

## 2024-05-29 PROBLEM — D68.59 THROMBOPHILIA AFFECTING PREGNANCY IN SECOND TRIMESTER, ANTEPARTUM (HCC): Status: RESOLVED | Noted: 2024-05-23 | Resolved: 2024-05-29

## 2024-05-29 PROBLEM — O36.8390 FETAL HEART RATE DECELERATIONS AFFECTING MANAGEMENT OF MOTHER: Status: RESOLVED | Noted: 2024-05-23 | Resolved: 2024-05-29

## 2024-05-29 PROBLEM — Z3A.25 25 WEEKS GESTATION OF PREGNANCY: Status: RESOLVED | Noted: 2024-05-26 | Resolved: 2024-05-29

## 2024-05-29 PROBLEM — O16.2 ELEVATED BLOOD PRESSURE COMPLICATING PREGNANCY, ANTEPARTUM, SECOND TRIMESTER: Status: RESOLVED | Noted: 2024-05-23 | Resolved: 2024-05-29

## 2024-05-29 LAB
B2 GLYCOPROT1 IGA SERPL IA-ACNC: 0.7 ELISA U/ML (ref 0–7)
B2 GLYCOPROT1 IGG SERPL IA-ACNC: 1.2 ELISA U/ML (ref 0–7)
B2 GLYCOPROT1 IGM SERPL IA-ACNC: <0.9 ELISA U/ML (ref 0–7)
CARDIOLIPIN IGA SER IA-ACNC: 1.9 APL (ref 0–14)
CARDIOLIPIN IGG SER IA-ACNC: 1.3 GPL (ref 0–10)
CARDIOLIPIN IGM SER IA-ACNC: 1.4 MPL (ref 0–10)
CMV IGG SERPL QL IA: NORMAL
CMV IGM SERPL QL IA: NORMAL
FACTOR XIII (F13A1) V34L VARIANT: NORMAL
FACTOR XIII VARIANT SPECIMEN: NORMAL
T GONDII IGG SER-ACNC: NORMAL IU/ML
T GONDII IGM SER-ACNC: NORMAL INDEX
TSH RECEPTOR AB: <1.1 IU/L

## 2024-05-29 PROCEDURE — 6370000000 HC RX 637 (ALT 250 FOR IP): Performed by: OBSTETRICS & GYNECOLOGY

## 2024-05-29 RX ORDER — IBUPROFEN 600 MG/1
600 TABLET ORAL EVERY 6 HOURS PRN
Qty: 60 TABLET | Refills: 1 | Status: SHIPPED | OUTPATIENT
Start: 2024-05-29

## 2024-05-29 RX ORDER — MODIFIED LANOLIN
1 OINTMENT (GRAM) TOPICAL
COMMUNITY
Start: 2024-05-29

## 2024-05-29 RX ORDER — ACETAMINOPHEN 500 MG
1000 TABLET ORAL EVERY 6 HOURS PRN
Refills: 0 | COMMUNITY
Start: 2024-05-29

## 2024-05-29 RX ORDER — LEVOTHYROXINE SODIUM 0.07 MG/1
75 TABLET ORAL
Qty: 30 TABLET | Refills: 6 | Status: SHIPPED | OUTPATIENT
Start: 2024-05-30

## 2024-05-29 RX ORDER — PSEUDOEPHEDRINE HCL 30 MG
100 TABLET ORAL 2 TIMES DAILY PRN
COMMUNITY
Start: 2024-05-29

## 2024-05-29 RX ORDER — OXYCODONE HYDROCHLORIDE 5 MG/1
5 TABLET ORAL EVERY 6 HOURS PRN
Qty: 20 TABLET | Refills: 0 | Status: SHIPPED | OUTPATIENT
Start: 2024-05-29 | End: 2024-06-03

## 2024-05-29 RX ORDER — SIMETHICONE 80 MG
80 TABLET,CHEWABLE ORAL EVERY 6 HOURS PRN
Refills: 0 | COMMUNITY
Start: 2024-05-29

## 2024-05-29 RX ADMIN — IBUPROFEN 600 MG: 600 TABLET, FILM COATED ORAL at 08:14

## 2024-05-29 RX ADMIN — FOLIC ACID 1 MG: 1 TABLET ORAL at 08:14

## 2024-05-29 RX ADMIN — OXYCODONE HYDROCHLORIDE 5 MG: 5 TABLET ORAL at 08:14

## 2024-05-29 RX ADMIN — Medication 2000 UNITS: at 08:13

## 2024-05-29 RX ADMIN — PRENATAL WITH FERROUS FUM AND FOLIC ACID 1 TABLET: 3080; 920; 120; 400; 22; 1.84; 3; 20; 10; 1; 12; 200; 27; 25; 2 TABLET ORAL at 08:14

## 2024-05-29 RX ADMIN — ASPIRIN 81 MG CHEWABLE TABLET 81 MG: 81 TABLET CHEWABLE at 08:14

## 2024-05-29 RX ADMIN — CYANOCOBALAMIN TAB 1000 MCG 1000 MCG: 1000 TAB at 08:14

## 2024-05-29 RX ADMIN — ACETAMINOPHEN 1000 MG: 500 TABLET ORAL at 01:46

## 2024-05-29 RX ADMIN — DOCUSATE SODIUM 100 MG: 100 CAPSULE, LIQUID FILLED ORAL at 08:13

## 2024-05-29 RX ADMIN — LEVOTHYROXINE SODIUM 75 MCG: 75 TABLET ORAL at 08:13

## 2024-05-29 ASSESSMENT — PAIN SCALES - GENERAL
PAINLEVEL_OUTOF10: 0
PAINLEVEL_OUTOF10: 4
PAINLEVEL_OUTOF10: 6

## 2024-05-29 ASSESSMENT — PAIN DESCRIPTION - ORIENTATION
ORIENTATION: LOWER
ORIENTATION: LOWER

## 2024-05-29 ASSESSMENT — PAIN DESCRIPTION - LOCATION
LOCATION: INCISION
LOCATION: ABDOMEN

## 2024-05-29 ASSESSMENT — PAIN - FUNCTIONAL ASSESSMENT
PAIN_FUNCTIONAL_ASSESSMENT: ACTIVITIES ARE NOT PREVENTED
PAIN_FUNCTIONAL_ASSESSMENT: ACTIVITIES ARE NOT PREVENTED

## 2024-05-29 ASSESSMENT — PAIN DESCRIPTION - DESCRIPTORS
DESCRIPTORS: DISCOMFORT;SORE;TENDER
DESCRIPTORS: DISCOMFORT;SORE

## 2024-05-29 NOTE — DISCHARGE SUMMARY
tablet chew and swallow 1 tablet by mouth once daily  Qty: 30 tablet, Refills: 3      Cholecalciferol (VITAMIN D) 50 MCG (2000 UT) CAPS capsule Take by mouth      FABB 2.2-25-1 MG TABS tablet take 1 tablet by mouth daily  Qty: 30 tablet, Refills: 1      Prenatal Vit-Fe Fumarate-FA (PRENATAL 1+1 PO) Take by mouth daily           Follow-Up Visit Plan: In 1 week for incision check and in 8  weeks for final postpartum visit.  Thyroid function testing would need to be repeated at that time to adjust thyroid medications.  Disposition: Home    Time Spent on Discharge:  30 minutes were spent in patient examination, evaluation, counseling as well as medication reconciliation, prescriptions for required medications, discharge plan and follow up.      Kashmir Queen MD MD,FACOG    5/29/2024 10:11 AM

## 2024-05-29 NOTE — DISCHARGE INSTR - DIET

## 2024-05-29 NOTE — PLAN OF CARE
Problem: Discharge Planning  Goal: Discharge to home or other facility with appropriate resources  2024 1103 by Ester Duarte RN  Outcome: Adequate for Discharge     Problem: Pain  Goal: Verbalizes/displays adequate comfort level or baseline comfort level  2024 1103 by Ester Duarte RN  Outcome: Adequate for Discharge  Flowsheets (Taken 2024 9151)  Verbalizes/displays adequate comfort level or baseline comfort level:   Encourage patient to monitor pain and request assistance   Assess pain using appropriate pain scale   Administer analgesics based on type and severity of pain and evaluate response   Implement non-pharmacological measures as appropriate and evaluate response     Problem: Vaginal Birth or  Section  Goal: Fetal and maternal status remain reassuring during the birth process  Description:  Birth OB-Pregnancy care plan goal which identifies if the fetal and maternal status remain reassuring during the birth process  2024 1103 by Ester Duarte RN  Outcome: Adequate for Discharge     Problem: Postpartum  Goal: Experiences normal postpartum course  Description:  Postpartum OB-Pregnancy care plan goal which identifies if the mother is experiencing a normal postpartum course  2024 1103 by Ester Duarte RN  Outcome: Adequate for Discharge     Problem: Postpartum  Goal: Appropriate maternal -  bonding  Description:  Postpartum OB-Pregnancy care plan goal which identifies if the mother and  are bonding appropriately  2024 1103 by Ester Duarte RN  Outcome: Adequate for Discharge     Problem: Postpartum  Goal: Establishment of infant feeding pattern  Description:  Postpartum OB-Pregnancy care plan goal which identifies if the mother is establishing a feeding pattern with their   2024 1103 by Ester Duarte RN  Outcome: Adequate for Discharge     Problem: Postpartum  Goal: Incisions, wounds, or drain sites healing without S/S of 
  Problem: Pain  Goal: Verbalizes/displays adequate comfort level or baseline comfort level  2024 0957 by Emily Jiang, RN  Outcome: Progressing  Flowsheets (Taken 2024 0930)  Verbalizes/displays adequate comfort level or baseline comfort level:   Encourage patient to monitor pain and request assistance   Assess pain using appropriate pain scale   Administer analgesics based on type and severity of pain and evaluate response   Implement non-pharmacological measures as appropriate and evaluate response  2024 2312 by Yudi Brown RN  Outcome: Progressing     Problem: Postpartum  Goal: Experiences normal postpartum course  Description:  Postpartum OB-Pregnancy care plan goal which identifies if the mother is experiencing a normal postpartum course  Outcome: Progressing  Goal: Appropriate maternal -  bonding  Description:  Postpartum OB-Pregnancy care plan goal which identifies if the mother and  are bonding appropriately  Outcome: Progressing  Goal: Incisions, wounds, or drain sites healing without S/S of infection  Outcome: Progressing     
  Problem: Pain  Goal: Verbalizes/displays adequate comfort level or baseline comfort level  5/27/2024 2312 by Yudi Brown, RN  Outcome: Progressing  5/27/2024 1123 by Radha Melo, RN  Outcome: Progressing     
0957 by Emily Jiang RN  Outcome: Progressing     Problem: Infection - Adult  Goal: Absence of infection at discharge  Outcome: Progressing  Goal: Absence of infection during hospitalization  Outcome: Progressing  Goal: Absence of fever/infection during anticipated neutropenic period  Outcome: Progressing     Problem: Safety - Adult  Goal: Free from fall injury  Outcome: Progressing     Problem: Chronic Conditions and Co-morbidities  Goal: Patient's chronic conditions and co-morbidity symptoms are monitored and maintained or improved  Outcome: Progressing     Problem: ABCDS Injury Assessment  Goal: Absence of physical injury  Outcome: Progressing

## 2024-05-29 NOTE — DISCHARGE INSTR - ACTIVITY
meals  Take pain medication as prescribed whenever you need them  To avoid/relieve constipation take stool softeners if advised   Increase fiber in your diet    Most importantly ENJOY your Baby!  - Dr. TRACEY =)))    Please call immediately with Questions and Concerns - [phone] (Office) or [phone] (Answering Service) after hours and weekends.     By signing below, I understand that if any emergent problems occur, once I leave the hospital, I am to dial #[phone] or go immediately to the nearest Emergency Room.  For less emergent matters,  Dr. Queen can be reached by calling his Office or  answering service at the above numbers.  I understand and acknowledge receipt of the instructions indicated above.

## 2024-05-29 NOTE — PROGRESS NOTES
ANTEPARTUM PROGRESS NOTE:  24W ADMISSION OLIGOHYDRAMNIOS - CATEGORY 2 TRACING    Patient:  Yasmeen Mcclelland     Admit Date:  5/22/2024  4:10 PM  Medical Record Number:  [id number]   Today's Date: 5/23/2024    S: Feels fine - doing well. Has no urinary or GI complaints. Denies contractions, LOF and VB. Has no Sx UTI or PIH.  There is good fetal movement.     O:   Vitals:    05/23/24 0450 05/23/24 0550 05/23/24 0650 05/23/24 0732   BP: 131/61 129/66 136/68 139/68   Pulse: 85 87 96 98   Resp:    18   Temp:    98.1 °F (36.7 °C)   TempSrc:    Oral   SpO2: 92% 93% 98% 97%   Weight:       Height:         Gen: A&O, cooperative, pleasant   Heart: RRR   Lungs: CTA b/l   Abd; soft, NT, non distended, +BS  Back: no CVA or paraspinal tenderness   Ext: No clubbing, cyanosis, edema: trace pedal , no cords palpable, no calf tenderness   Neuro: intact   Uterus: Gravid, nt ; FH < dates  Lilia pad: dry    FHT: appropriate for gestational age  Baseline FHR: 130 BPM  Baseline FHR Variability: mild in degree  Accelerations: present  Periodic or Episodic Decelerations: occasional   Changes or Trends of FHR pattern over time:   Frequency and intensity of uterine contractions: none    Cervix: not examined  Membranes: Intact.    Lab Results   Component Value Date    HGB 12.6 05/22/2024    HCT 38.0 05/22/2024      Recent Results (from the past 72 hour(s))   POC AmniSure    Collection Time: 05/22/24  5:00 PM   Result Value Ref Range    Amnisure, POC Negative Negative    Lot Number [id number]     Positive QC Pass/Fail Acceptable     Negative QC Pass/Fail Acceptable    TYPE AND SCREEN    Collection Time: 05/22/24  7:25 PM   Result Value Ref Range    Blood Bank Sample Expiration 05/25/2024,9159     Arm Band Number [id number]     ABO/Rh B POSITIVE     Antibody Screen NEGATIVE    CBC with Auto Differential    Collection Time: 05/22/24  7:25 PM   Result Value Ref Range    WBC 15.3 (H) 4.5 - 11.5 k/uL    RBC 4.30 3.50 - 5.50 m/uL    Hemoglobin 12.6 11.5 - 
Assumed care of patient. Report received from Rosa M BARRERA  
Assumed care of pt, pt on EFM and call light within reach, pt is tired because she didn't sleep well and she states good fetal movement, denies lOF or vaginal bleeding or contractions. Pt has call light within reach.    
Assumed care of pt, pt on EFM, pt states good fetal movement denies LOF or vaginal bleeding or contractions. Pt states she is just tired from not sleeping last night. Pt has call light within reach.    
Assumed care of pt. Pt denies ctx, Lof or vaginal bleeding. Pt perceives positive fetal movement. EFM adjusted, call light within reach  
Assumed care of pt. Pt denies headache, nausea/vomiting, visual changes, or epigastric pain. VSS. Call light within reach.   
Attending Ob Coverage Pre-Partum Note    S:  Patient admitted secondary to decreased DANIEL of 4 on F/U anatomy scan in Dr Queen' office today.  Pt denies contractions. Has had some questionable LOF over the past two weeks.  Amnisure negative today.  Pt states had nl NIPT.    O: /76   Pulse 80   Temp 98.7 °F (37.1 °C) (Oral)   Resp 16   Ht 1.727 m (5' 8\")   Wt 117.5 kg (259 lb)   LMP 2023   BMI 39.38 kg/m²               ABD:    FHT's with baseline of approx 150's with mod wood with period of decreased variability.  Recurrent spontaneous episodic variable fetal heart rate decelerations approx q 10 despite IVFB and repositioning of patient.      (Tracing viewed remotely on OBIX)    IMP:  1. IUP @ 24 3/7 weeks           2.  Oligohydramnios, uncertain etiology (PPPROM vs physiologic)           3.  Cat II FHT with extreme prematurity  Patient Active Problem List   Diagnosis    LGSIL on Pap smear of cervix    High risk human papilloma virus (HPV) infection of cervix    24 weeks gestation of pregnancy        Plan: 1.  Case discussed with MFM who agrees with expectant management with  delivery only with worsening FHT due to extreme prematurity and expected poor outcome           2.  Will start magnesium sulfate for neuro-protection           3.  [place]           4.  Will ask Neonatology to please meet with and discuss co-morbidities of extreme prematurity should patient need to be delivered           5.  Continue IV hydration           6.  Spoke with patient on speaker phone in room discussing all of the above.  Pt understands situation and had not questions at this time    
Back from NICU in stable condition.  
DR Denton updated that pt continues to have variable decelerations with periods of minimal variability. States he will review tracing at home and call back.   
Dr Denton called unit with order to consult NICU states he will call MFM (Dr Denise covering) about management of patient moving forward. Care ongoing.   
Dr Denton updated on FHR tracing again showing persistent variables along with minimal variability and a prolonged deceleration. No new orders at this time.   
Dr Denton updated on FHR tracing showing minimal to absent variability at times with recurrent variables despite position changes. States he will review tracing at home and call with further orders.   
Dr Denton updated on FHR tracing showing variable deceleration to 60's bpm lasting 60 seconds along with variable to 120's bpm occurring at 0130. FHR tracing also showing minimal variability at this time. No new orders. Care ongoing.   
Dr Denton updated on FHR tracing. Decelerations resolved with fluid bolus and position changes. New orders to continue to position changes and continue to update him as necessary. Care ongoing.   
Dr. Biswas called to patient room to evaluate prolonged deceleration [phone].   Patient turned to left, right, hands and knees. Fluids increased, oxygen applied. Dr. Queen updated.   
Dr. Biswas called to review tracing. FHT still with minimal variability with subtle recurrent decels and variables. Dr. Biswas stated to call Dr. Queen as Dr. Biswas still recommends to proceed with delivery.   Dr. Queen Updated about tracing, he is to review the tracing himself.   
Dr. Denise notified of consult, no new orders obtained  
Dr. Oneil inquiring about pt, updated on FHT's and no new orders obtained.    
Dr. Queen notified of prolong decels, no new orders obtained.    
Dr. Queen on unit and reviewing tracing, no new orders obtained.  Dr. Martinez also updated on pt and states she will ask her tech to come in soon for the BPP scan  
Dr. Queen updated on 2 minute prolonged deceleration that resolved with fluid bolus and postion changes as well as other variables into the 70s and 80s. No new orders at this time.  
HD #2 ANTEPARTUM PROGRESS NOTE:  24W ADMISSION OLIGOHYDRAMNIOS - CATEGORY 2 TRACING    Patient:  Yasmeen Mcclelland     Admit Date:  5/22/2024  4:10 PM  Medical Record Number:  [id number]   Today's Date: 5/24/2024    S: Feels fine - doing well. Has no urinary or GI complaints.  Had a bowel movement this morning.  Denies contractions, LOF and VB. Has no Sx UTI or PIH.  There is good fetal movement.     O:   Vitals:    05/23/24 2015 05/23/24 2115 05/23/24 2215 05/24/24 0730   BP: (!) 116/56 (!) 131/58 132/60 (!) 127/56   Pulse: 82 83 85 71   Resp: 16 16 16 16   Temp:    98.6 °F (37 °C)   TempSrc:    Oral   SpO2: 99% 99% 98%    Weight:       Height:         Gen: A&O, cooperative, pleasant   Heart: RRR   Lungs: CTA b/l   Abd; soft, NT, non distended, +BS  Back: no CVA or paraspinal tenderness   Ext: No clubbing, cyanosis, edema: trace b/l pedal , no cords palpable, no calf tenderness   Neuro: intact   Uterus: Gravid, nt ; FH < dates  Lilia pad: dry    FHT: appropriate for gestational age  Baseline FHR: 150 BPM - intermittent variable decelerations  Baseline FHR Variability: mild in degree  Accelerations: present  Periodic or Episodic Decelerations: occasional variable decelerations with recovery  Changes or Trends of FHR pattern over time: No significant baseline changes  Frequency and intensity of uterine contractions: none    Cervix: not examined  Membranes: Intact.    Lab Results   Component Value Date    HGB 12.6 05/22/2024    HCT 38.0 05/22/2024      Recent Results (from the past 72 hour(s))   POC AmniSure    Collection Time: 05/22/24  5:00 PM   Result Value Ref Range    Amnisure, POC Negative Negative    Lot Number [id number]     Positive QC Pass/Fail Acceptable     Negative QC Pass/Fail Acceptable    TYPE AND SCREEN    Collection Time: 05/22/24  7:25 PM   Result Value Ref Range    Blood Bank Sample Expiration 05/25/2024,2359     Arm Band Number [id number]     ABO/Rh B POSITIVE     Antibody Screen NEGATIVE    CBC with Auto 
HD #4 ANTEPARTUM PROGRESS NOTE:  24W ADMISSION OLIGOHYDRAMNIOS - CATEGORY 2 TRACING    Patient:  Yasmeen Mcclelland     Admit Date:  5/22/2024  4:10 PM  Medical Record Number:  [id number]   Today's Date: 5/26/2024    S: Feels fine - doing well. Has no urinary or GI complaints. Denies contractions, LOF and VB. Has no Sx UTI or PIH.  There is good fetal movement.  In a phone conversation with Dr. Tipton, after reviewing the increased frequency of periods of decelerations and minimal variability in addition to absent and reversal of end-diastolic flow on Doppler cord studies it was determined that delivery was advisable.    O:   Vitals:    05/25/24 0742 05/25/24 1208 05/25/24 2016 05/26/24 0746   BP: 132/61 135/67 129/65 (!) 111/53   Pulse: 67 71 71 61   Resp: 16 16 16 16   Temp: 98 °F (36.7 °C) 98.3 °F (36.8 °C) 98.3 °F (36.8 °C) 98.6 °F (37 °C)   TempSrc: Oral Oral Oral Oral   SpO2:       Weight:       Height:         Gen: A&O, cooperative, pleasant   Heart: RRR   Lungs: CTA b/l   Abd; soft, NT, non distended, +BS  Back: no CVA or paraspinal tenderness   Ext: No clubbing, cyanosis, edema: trace b/l pedal , no cords palpable, no calf tenderness   Neuro: intact   Uterus: Gravid, nt ; FH < dates  Lilia pad: dry    FHT: appropriate for gestational age  Baseline FHR: 155 BPM.  Baseline FHR Variability: mild to moderate in degree with episodes of minimal variability  Accelerations: present  Periodic or Episodic Decelerations:  - intermittent variable decelerations  - with spontaneous decelerations with recovery  Changes or Trends of FHR pattern over time: No significant baseline changes  Frequency and intensity of uterine contractions: none    Cervix: not examined  Membranes: Intact.    Lab Results   Component Value Date    HGB 11.0 (L) 05/26/2024    HCT 34.2 05/26/2024      Recent Results (from the past 72 hour(s))   Lactate Dehydrogenase    Collection Time: 05/23/24  6:36 PM   Result Value Ref Range     135 - 214 U/L 
ITSP for evalaution for decel to valorie of 60s from 0142 am to 0152 am. D/w with . RN to update  if min wood and decel continue, would need o proceed towards delivery.   
MFM Follow-Up Consultation/Antepartum Note    S:  The ***    O:   Patient Vitals for the past 24 hrs:   BP Temp Temp src Pulse Resp SpO2   05/25/24 1208 135/67 98.3 °F (36.8 °C) Oral 71 16 --   05/25/24 0742 132/61 98 °F (36.7 °C) Oral 67 16 --   05/24/24 1939 (!) 129/59 98.2 °F (36.8 °C) Oral 80 18 99 %   05/24/24 1533 (!) 115/59 98.4 °F (36.9 °C) Oral 68 16 --        Gen NAD  CV RRR  Lungs CTA B  Abd Gravid/soft/NTTP/NABS  Ext no edema BLE, no calf TTP BLE, +1 patellar reflexes BLE, no clonus BLE    Ultrasound *** wk *** days:     ***        LABS:    Recent Labs     05/22/24 1925 05/24/24  2328   WBC 15.3* 13.6*   RBC 4.30 3.48*   HGB 12.6 10.1*   HCT 38.0 31.9*   MCV 88.4 91.7   MCH 29.3 29.0   MCHC 33.2 31.7*   RDW 13.2 13.2    252   MPV 9.9 10.1       Recent Labs     05/24/24  2328      K 3.5      CO2 20*   BUN 10   CREATININE 0.6   GLUCOSE 114*   CALCIUM 8.4*       No results for input(s): \"POCGLU\" in the last 72 hours.    {LABS:395284910}      Admission on 05/22/2024   Component Date Value Ref Range Status    Amnisure, POC 05/22/2024 Negative  Negative Final    Lot Number 05/22/2024 [id number]   Final    Positive QC Pass/Fail 05/22/2024 Acceptable   Final    Negative QC Pass/Fail 05/22/2024 Acceptable   Final    Blood Bank Sample Expiration 05/22/2024 05/25/2024,235   Final    Arm Band Number 05/22/2024 [id number]   Final    ABO/Rh 05/22/2024 B POSITIVE   Final    Antibody Screen 05/22/2024 NEGATIVE   Final    WBC 05/22/2024 15.3 (H)  4.5 - 11.5 k/uL Final    RBC 05/22/2024 4.30  3.50 - 5.50 m/uL Final    Hemoglobin 05/22/2024 12.6  11.5 - 15.5 g/dL Final    Hematocrit 05/22/2024 38.0  34.0 - 48.0 % Final    MCV 05/22/2024 88.4  80.0 - 99.9 fL Final    MCH 05/22/2024 29.3  26.0 - 35.0 pg Final    MCHC 05/22/2024 33.2  32.0 - 34.5 g/dL Final    RDW 05/22/2024 13.2  11.5 - 15.0 % Final    Platelets 05/22/2024 291  130 - 450 k/uL Final    MPV 05/22/2024 9.9  7.0 - 12.0 fL Final    Neutrophils % 
MFM Note    The nurse called me at approximately 12:25 AM to review the patient's fetal heart rate tracing.    5/24:  1908-2 to 3 minutes deceleration to the 60s  0909-8018 -- Loss of contact  2156-variable  2200 variable  2116 fetal heart rate baseline 150s with minimal variability  2248 variable  2324 variable  2328 ?  Fetal heart rate deceleration then loss of contact until 2336  2340 variable to the 70s  2352 deep variable    5/25:  0004 deep variable  0008 deep variable  9614-3623 as of contact    The patient's nurse reported that they had changed position with the recurrent variable decelerations.  Nursing was asked to contact Dr. Queen and provide an update regarding the fetal heart rate tracing.      Delivery is recommended with continued fetal heart rate decelerations.  
MFM Note    The patient is a 28-year-old  2 para 0-0-1-0 currently at 25 weeks 0 days (LMP = 8 WK US) who has been hospitalized secondary to fetal growth restriction, oligohydramnios, and fetal heart rate decelerations.  The patient received a course of betamethasone on -.  She also received magnesium sulfate for fetal neuroprotection on -.    The patient has remained stable in the hospital with continuous fetal monitoring.  The fetus has had intermittent variable decelerations and fetal heart rate decelerations.  The plan was for delivery with recurrent/persistent fetal heart rate decelerations or nonreassuring fetal testing.    A 10 to 11-minute fetal heart rate deceleration was noted approximately 0142 today.  Since that time, there have been variable fetal heart rate decelerations and recurrent fetal heart rate decelerations.  A repeat ultrasound was requested.    Ultrasound was repeated at 25 weeks 0 days.  There is a single intrauterine gestation in a breech presentation with a heart rate of 167 bpm.  The placenta is posterior.  The DANIEL is 3.6 cm.  A 2 x 2 centimeter pocket of fluid was not seen.  The biophysical profile score was 4/8 with points deducted for the amniotic fluid and fetal breathing.  The total biophysical profile score be 4/10 given the nonreassuring fetal heart rate tracing.  The umbilical artery PI was elevated with intermittent absent and reversal of flow.  The absent and reversal of flow is new today.  The MCA PI and CPR PI were both decreased consistent with brain sparing.  The MCA PSV was elevated at 1.72 MOM.  There was intermittent absence of the A wave in the ductus venosus.    Given the biophysical profile score 4/10 with the recurrent fetal heart rate decelerations noted during monitoring, the recommendation was made to proceed with delivery given the significant increased risk for fetal morbidity and mortality with continued expectant management.  The risks 
Magnesium turned off. Keita removed. Pt up to bathroom; RN at side. Ambulated well.   
No referring provider defined for this encounter.       RE:  YASMEEN CURRY  : 1995   AGE: 28 y.o.    Dear Dr. Peres ref. provider found:    I saw your patient Yasmeen Curry today for the following indications:    Patient Active Problem List   Diagnosis    24 5/7 weeks gestation of pregnancy    RAMÓN-1 4G/5G genotype    MTHFR mutation    Thrombophilia affecting pregnancy in second trimester, antepartum (mild complex, on LD ASA, PNV and FA)    Elevated blood pressure complicating pregnancy, antepartum, second trimester    Maternal obesity affecting pregnancy, antepartum    Poor fetal growth affecting management of mother in second trimester    Oligohydramnios in second trimester    Fetal heart rate decelerations affecting management of mother       As you know, Ms. Curry is a 28 y.o.  at 24w5d  (***) who is admitted with  ***.  A Maternal-Fetal Medicine consult was requested by No ref. provider found to address these issue(s).     OB History    Para Term  AB Living   2       1     SAB IAB Ectopic Molar Multiple Live Births   1                # Outcome Date GA Lbr Sharad/2nd Weight Sex Delivery Anes PTL Lv   2 Current            1 SAB 2023     SAB          PAST OBSTETRICAL HISTORY:      PAST GYNECOLOGICAL  HISTORY:  {Pos/neg/fixed:00505} for abnormal pap smears. ***  {Pos/neg/fixed:71947} for sexually transmitted diseases. ***  {Pos/neg/fixed:16881} for cervical LEEP / conization /cryosurgery. ***   {Pos/neg/fixed:30470} for uterine surgery. ***  {Pos/neg/fixed:78691} for ovarian or tubal surgery. ***    PAST MEDICAL HISTORY:     MEDICATIONS:  ***    ILLNESSES:  ***    BLOOD TRANSFUSIONS:  ***    IMMUNIZATIONS:   Up-to-date    SURGERIES:  ***    HOSPITALIZATIONS:  ***    ALLERGIES:  ***    SOCIAL HISTORY:  ***      FAMILY HISTORY:  CANCER: ***  CAD: ***  CVA: ***  CONGENITAL ANOMALIES: ***  DIABETES: ***  DVT: ***  BLEEDING DISORDERS: ***  AUTOIMMUNE DISORDERS: ***      Past Medical 
POD #2    Patient:  Yasmeen Mcclelland     Admit Date:  5/22/2024  4:10 PM  Medical Record Number:  [id number]   Today's Date: 5/28/2024    S: No complaints, doing well.  Baby doing well; tolerating diet: yes -general; ambulating well: yes -up in room and in halls, down to NICU; voiding without difficulty:  yes -has no urinary complaints; bm: yes -normal, last night and again this morning; flatus: yes -normal; pain meds appropriate: yes -ibuprofen, Roxicodone and Tylenol; vaginal bleeding: Lighter than a period.    O:   Vitals:    05/27/24 1440 05/27/24 2300 05/28/24 0720 05/28/24 1446   BP: (!) 128/56 (!) 116/58 132/67 138/65   Pulse: 75 84 73 85   Resp: 16 16 16 18   Temp: 98.4 °F (36.9 °C) 98.2 °F (36.8 °C) 98.7 °F (37.1 °C) 98.7 °F (37.1 °C)   TempSrc: Temporal Temporal Oral Oral   SpO2: 98% 96% 96% 98%   Weight:       Height:         Gen: A&O, cooperative, pleasant   Heart: RRR   Lungs: CTA b/l   Abd; soft, NT, non distended, +BS  Back: no CVA or paraspinal tenderness   Ext: No clubbing, cyanosis, edema:1+ , no cords palpable, no calf tenderness   Neuro: intact   Inc: clean, dry, intact with Mepilex, no drainage  Uterus: firm, well contracted, nt   Lilia pad: staining only, thin lochia    Recent Results (from the past 72 hour(s))   TYPE AND SCREEN    Collection Time: 05/26/24  2:45 AM   Result Value Ref Range    Blood Bank Sample Expiration 05/29/2024,2359     Arm Band Number [id number]     ABO/Rh B POSITIVE     Antibody Screen NEGATIVE    CBC    Collection Time: 05/26/24  2:45 AM   Result Value Ref Range    WBC 12.6 (H) 4.5 - 11.5 k/uL    RBC 3.74 3.50 - 5.50 m/uL    Hemoglobin 11.0 (L) 11.5 - 15.5 g/dL    Hematocrit 34.2 34.0 - 48.0 %    MCV 91.4 80.0 - 99.9 fL    MCH 29.4 26.0 - 35.0 pg    MCHC 32.2 32.0 - 34.5 g/dL    RDW 13.4 11.5 - 15.0 %    Platelets 247 130 - 450 k/uL    MPV 10.0 7.0 - 12.0 fL   CBC    Collection Time: 05/27/24  7:34 AM   Result Value Ref Range    WBC 14.7 (H) 4.5 - 11.5 k/uL    RBC 3.75 3.50 - 
POD #3    Patient:  Yasmeen Mcclelland     Admit Date:  2024  4:10 PM  Medical Record Number:  [id number]   Today's Date: 2024    S: No complaints, doing well, ready for discharge home; tolerating diet: yes -general; ambulating well: yes -up in room, halls and down to NICU; voiding without difficulty:  yes -has no urinary complaints; bm: yes -normal; flatus: yes -normal; pain meds appropriate: yes -Roxicodone, Tylenol and ibuprofen; vaginal bleeding: Less than a period.    O:   Vitals:    24 1446 24 2157 24 2300 24 0704   BP: 138/65  133/67 138/70   Pulse: 85  78 79   Resp: 18 16 16 16   Temp: 98.7 °F (37.1 °C)  98.4 °F (36.9 °C) 98.7 °F (37.1 °C)   TempSrc: Oral  Temporal Oral   SpO2: 98%  96% 96%   Weight:       Height:         Gen: A&O, cooperative, pleasant   Heart: RRR   Lungs: CTA b/l   Abd; soft, NT, non distended, +BS  Back: no CVA or paraspinal tenderness   Ext: No clubbing, cyanosis, edema:1+ , no cords palpable, no calf tenderness   Neuro: intact   Inc: clean, dry, intact with Mepilex, no drainage  Uterus: firm, well contracted, nt   Lilia pad: staining only, thin lochia    Recent Results (from the past 72 hour(s))   CBC    Collection Time: 24  7:34 AM   Result Value Ref Range    WBC 14.7 (H) 4.5 - 11.5 k/uL    RBC 3.75 3.50 - 5.50 m/uL    Hemoglobin 10.9 (L) 11.5 - 15.5 g/dL    Hematocrit 34.8 34.0 - 48.0 %    MCV 92.8 80.0 - 99.9 fL    MCH 29.1 26.0 - 35.0 pg    MCHC 31.3 (L) 32.0 - 34.5 g/dL    RDW 13.4 11.5 - 15.0 %    Platelets 254 130 - 450 k/uL    MPV 10.0 7.0 - 12.0 fL       A: 28 y.o. female  at 25w0d  POD#3 S/P  primary low transverse  section  Mild postoperative anemia of acute blood loss superimposed on to mild iron deficiency anemia pregnancy  New onset hypothyroidism, on Synthroid 75 mcg daily  Thrombophilia (RAMÓN-1 4G/5G heterozygote, MTHFR 677 heterozygote)  Blood pressure elevations in pregnancy -normotensive this admission  Maternal 
Patient back in room from [facility]   
Patient declined  services at this time.  
Patient instructed on discharge instructions with verbalized understanding. Questions answered prn. Patient was given a copy for her records.   
Patient off monitor and to [facility]   
Patient was out of room when  rounded.  left prayer cards.  
Primary classic  of live born female at 1114. APGARS 3/7. Infant handed to awaiting NICU staff.   
Progress Note  Have been updated regularly on FHT's.  Tracing with baseline of 150 with alternating moderate, then decreased variability and intermittent \"runs\" of spontaneous decelerations which recover.  Pt still not feeling any contractions.  Uterus soft per RN at bedside.  P:  Delay in decision to deliver based on extreme prematurity of gestational     age and expectation of poor fetal outcome   Will continue to monitor and deliver for any persistent decelerations  
Pt and family updated on plan of care  
Pt back from [facility] ultrasound, pt requesting to shower at this time.    
Pt back from [facility] ultrasound.  EFM applied fetal heart tones present and abdomen soft and gravid.  Pt has call light within reach.    
Pt back from [facility] per [facility] delivery recommended.  Pt getting preped for surgery.  Report given to Edgar BARRERA  
Pt back from [facility], pt requesting a shower at this time, bed linens changed  
Pt brought over via stretcher by L+D nurse in stable condition.  Alert and oriented x e and denies pain.  Was able to transfer self from stretcher to bed.  IV LR and IV pitocin infusing as ordered.  SCD's and pulse ox applied and pt instructed on how to use IS.  Bedside folder with papers reviewed with pt-verbalized understanding.  Unsure about receiving TDAP vaccine.  Oriented to room and unit and unit policies.  
Pt off EFM for MFM ultrasound    
Pt off EFM for another MFM ultrasound  
Pt off EFM for c/s  
Pt off efm for mfm consult  
Pt off efm for mfm ultrasound    
Pt presents to L&D from the office for a low DANIEL.  Pt states she has some leaking when she sneezes but did not think she was ruptured.  Pt states good fetal movement, denies vaginal bleeding or contractions.  Pt given call light and instructed on use.    
Pt resting in bed, her mother at bedside. Pt has call light within reach. Pt on EFM fetal heart tones tracing and pt states good fetal movement, pt denies LOF or vaginal bleeding. Pt has no concerns at this time.    
Pt to NICU to see  with  via wheelchair in stable condition.   
Pt to NICU with  via WC to see baby in stable condition.  
Pt up to BR-voided qs.  States she's passing gas and tolerating meals well.  Encouraged to walk in hallways.  Planning to go to NICU to see baby again today-told to make nurse aware when leaving the unit.  
Pt. Keita removed. Pt. Dangled at the bedside and was able to ambulate to the bathroom. Lilia care done with assistance. Pt. Ambulated back to bed and is resting comfortably in bed.   
Received report from Erika BARRERA. Pt denies ctx, vaginal bleeding, and loss of fluid. Call light within reach.  
Updated Dr. Martinez on tracing, instructed to call Dr. Bret Queen called and updated on tracing, instructed to monitor tracing for 30 minutes   
Updated Dr. Queen on FHT, reccurent decelerations, minimal variability and Tachy. Dr. Queen to review tracing. No new orders at this time.     
TO 2  0 TO 2 /HPF Final    Homocysteine 2024 4.9  0.0 - 15.0 umol/L Final    Blood Bank Sample Expiration 2024,2359   Final    Arm Band Number 2024 [id number]   Final    ABO/Rh 2024 B POSITIVE   Final    Antibody Screen 2024 NEGATIVE   Final    WBC 2024 12.6 (H)  4.5 - 11.5 k/uL Final    RBC 2024 3.74  3.50 - 5.50 m/uL Final    Hemoglobin 2024 11.0 (L)  11.5 - 15.5 g/dL Final    Hematocrit 2024 34.2  34.0 - 48.0 % Final    MCV 2024 91.4  80.0 - 99.9 fL Final    MCH 2024 29.4  26.0 - 35.0 pg Final    MCHC 2024 32.2  32.0 - 34.5 g/dL Final    RDW 2024 13.4  11.5 - 15.0 % Final    Platelets 2024 247  130 - 450 k/uL Final    MPV 2024 10.0  7.0 - 12.0 fL Final         A/P:  28 y.o.  at 25w0d (***) with:    1.         --The total time spent on today's visit was *** minutes.  This included preparation for the consultation (i.e. reviewing prior external notes and test results), performance of a medically appropriate history and examination, counseling, orders for medications, tests or other procedures, and coordination of care.  Greater than 50% of the time was spent face-to-face with the patient and with counseling and coordination of care.  This time is exclusive of procedures performed.   I answered all of  the patient's questions to her satisfaction.      --If you have any questions regarding her management, please contact me at your convenience and thank you for allowing me to participate in her care.        Lynette Martinez MD, FACOG [facility]  [phone]        *All or parts of this note may have been generated using a voice recognition program. There may be typo, grammar, or Word substitution errors that have escaped my review of this note.     
dose, repeat thyroid function testing at postpartum visit.  Anticipate home tomorrow.    Kashmir Queen MD FACOG  5/27/2024 8:50 PM  
deliver for any persistent decelerations with evidence of deterioration of fetal status.  Continue SCDs while in bed for DVT prevention.    Kashmir Queen MD, M.D. FACOG  5/25/2024 1:57 PM

## 2024-05-29 NOTE — LACTATION NOTE
Replenished pump supplies. Encouraged patient to call if she needs anything at all.    Sugar Syed, CLS

## 2024-05-29 NOTE — DISCHARGE INSTRUCTIONS
experiencing extreme weakness or dizziness.   If you are having flu-like symptoms such as achy muscles or joints.  There is a foul smell or a green color to your vaginal bleeding.  If you have pain that cannot be relieved.  You have persistent burning with urination or frequency.   Call if you have concerns about your well-being.  You are unable to sleep, eat, or are having thoughts of harming yourself or your baby.   You have swelling, bleeding, drainage, foul odor, redness, or warmth in/around your incision or stitches.  You have a red, warm, tender area in you calf.

## 2024-05-30 LAB
CONFIRM APTT STACLOT: NORMAL S
DRVVT SCREEN TO CONFIRM RATIO: NORMAL {RATIO}
HEPARIN NT PPP QL: NORMAL
LA 3 SCREEN W REFLEX-IMP: NORMAL
LMW HEPARIN IND PLT AB SER QL: NORMAL
MIXING DRVVT: NORMAL S
NEUTRALIZED DRVVT SCREEN RATIO: NORMAL
PROT S FREE AG ACT/NOR PPP IA: 48 % (ref 55–123)
PROTHROMBIN TIME: 12.5 S (ref 12–15.5)
SCREEN APTT: 0.89 S
SCREEN APTT: NORMAL S
SCREEN DRVVT: 0.96 S
THROMBIN TIME: NORMAL S
THYROGLOBULIN AB: <12 IU/ML (ref 0–40)
THYROID STIMULATING IMMUNOGLOB: <0.1 IU/L
THYROPEROXIDASE AB SERPL IA-ACNC: <4 IU/ML (ref 0–25)

## 2024-06-04 LAB — SURGICAL PATHOLOGY REPORT: NORMAL

## 2024-06-25 PROBLEM — E03.9 HYPOTHYROIDISM: Status: ACTIVE | Noted: 2024-06-25

## 2024-06-25 PROBLEM — O99.210 OBESITY AFFECTING PREGNANCY, ANTEPARTUM: Status: ACTIVE | Noted: 2024-06-25

## 2024-06-25 PROBLEM — E53.8 LOW FOLATE: Status: ACTIVE | Noted: 2024-06-25

## 2024-06-25 PROBLEM — R79.89 LOW VITAMIN B12 LEVEL: Status: ACTIVE | Noted: 2024-06-25

## 2024-06-25 PROBLEM — R79.89 LOW VITAMIN D LEVEL: Status: ACTIVE | Noted: 2024-06-25

## 2024-07-02 ENCOUNTER — OFFICE VISIT (OUTPATIENT)
Dept: OBGYN CLINIC | Age: 29
End: 2024-07-02
Payer: COMMERCIAL

## 2024-07-02 VITALS
HEART RATE: 79 BPM | BODY MASS INDEX: 37.56 KG/M2 | DIASTOLIC BLOOD PRESSURE: 80 MMHG | SYSTOLIC BLOOD PRESSURE: 123 MMHG | WEIGHT: 247 LBS

## 2024-07-02 DIAGNOSIS — Z87.59 HISTORY OF NEONATAL DEATH: ICD-10-CM

## 2024-07-02 DIAGNOSIS — Z31.69 ENCOUNTER FOR PRECONCEPTION CONSULTATION: Primary | ICD-10-CM

## 2024-07-02 DIAGNOSIS — Z98.891 HX OF CESAREAN SECTION: ICD-10-CM

## 2024-07-02 DIAGNOSIS — Z87.59 HISTORY OF PRIOR PREGNANCY WITH IUGR NEWBORN: ICD-10-CM

## 2024-07-02 PROBLEM — E53.8 LOW FOLATE: Status: RESOLVED | Noted: 2024-06-25 | Resolved: 2024-07-02

## 2024-07-02 PROBLEM — O99.019 IRON DEFICIENCY ANEMIA OF PREGNANCY: Status: RESOLVED | Noted: 2024-05-26 | Resolved: 2024-07-02

## 2024-07-02 PROBLEM — R79.89 LOW VITAMIN D LEVEL: Status: RESOLVED | Noted: 2024-06-25 | Resolved: 2024-07-02

## 2024-07-02 PROBLEM — D62 POSTOPERATIVE ANEMIA DUE TO ACUTE BLOOD LOSS: Status: RESOLVED | Noted: 2024-05-28 | Resolved: 2024-07-02

## 2024-07-02 PROBLEM — R79.89 TSH ELEVATION: Status: RESOLVED | Noted: 2024-05-25 | Resolved: 2024-07-02

## 2024-07-02 PROBLEM — D50.9 IRON DEFICIENCY ANEMIA OF PREGNANCY: Status: RESOLVED | Noted: 2024-05-26 | Resolved: 2024-07-02

## 2024-07-02 PROBLEM — O99.210 OBESITY AFFECTING PREGNANCY, ANTEPARTUM: Status: RESOLVED | Noted: 2024-06-25 | Resolved: 2024-07-02

## 2024-07-02 PROBLEM — R79.89 LOW VITAMIN B12 LEVEL: Status: RESOLVED | Noted: 2024-06-25 | Resolved: 2024-07-02

## 2024-07-02 PROCEDURE — 99203 OFFICE O/P NEW LOW 30 MIN: CPT | Performed by: OBSTETRICS & GYNECOLOGY

## 2024-07-02 PROCEDURE — 99204 OFFICE O/P NEW MOD 45 MIN: CPT | Performed by: OBSTETRICS & GYNECOLOGY

## 2024-07-02 NOTE — PROGRESS NOTES
Patient is here today for preconceptual consult. Patient saw dr. Martinez in the hospital during recently lost and was supposed to be put on her schedule for today's visit.   
 I reassured her that neither only should not commission of her activities in pregnancy had nothing to do with the loss of her pregnancy.  She desired to know when she could try to get pregnant again and I reassured her when her mom is ready to try again her body would be also ready and that there was no set time that she needed to wait.  We also discussed some of the findings such as the absent/reversed end-diastolic flow noted on umbilical Dopplers as well as the oligohydramnios that was also noted.  It did not appear that this pregnancy was complicated by preeclampsia.  I discussed her thrombophilias as they are low risk thrombophilias I do not think that they contributed to her pregnancy complications at all.    RECOMMENDATIONS:  Each of the recommendations were discussed with the patient:  1.  I told Yasmeen I did not think that there is anything to add in terms of medication to this pregnancy.  Last pregnancy she was on baby aspirin, folic acid, vitamin D, and vitamin B12.  I do not think that she would be a good candidate for Lovenox.  2.  She is to return to see me with pregnancy.          The patient is to continue to follow with you in your office for ongoing obstetric care.        PLAN:    As noted above or sooner prn.    Sincerely,        Deon Whitfield MD    I spent 45 minutes of direct contact time with the patient of which greater than 50% of the time was used to  the patient, discuss complications and problems related to her pregnancy, or coordinating her care in addition to the time spent interpreting her ultrasound. I answered all of her questions to her satisfaction.

## 2025-02-01 ENCOUNTER — HOSPITAL ENCOUNTER (OUTPATIENT)
Age: 30
Discharge: HOME | End: 2025-02-01
Payer: COMMERCIAL

## 2025-02-01 DIAGNOSIS — R93.89 THICKENED ENDOMETRIUM: ICD-10-CM

## 2025-02-01 DIAGNOSIS — N92.6 IRREGULAR MENSES: ICD-10-CM

## 2025-02-01 LAB
25(OH)D3 SERPL-MCNC: 33.9 NG/ML (ref 30–100)
B-HCG SERPL EIA 3RD IS-ACNC: <0.1 MIU/ML (ref 0–7)
CORTIS SERPL-MCNC: 7 UG/DL (ref 2.7–18.4)
ESTRADIOL LEVEL: 37.1 PG/ML
FSH SERPL-ACNC: 6.6 MIU/ML
HBA1C MFR BLD: 5.1 % (ref 4–5.6)
LH SERPL-ACNC: 7 MIU/ML
PROLACTIN SERPL-MCNC: 14.73 NG/ML
T4 FREE SERPL-MCNC: 1.7 NG/DL (ref 0.9–1.7)
TSH SERPL DL<=0.05 MIU/L-ACNC: 1.19 UIU/ML (ref 0.27–4.2)

## 2025-02-01 PROCEDURE — 84439 ASSAY OF FREE THYROXINE: CPT

## 2025-02-01 PROCEDURE — 82306 VITAMIN D 25 HYDROXY: CPT

## 2025-02-01 PROCEDURE — 84443 ASSAY THYROID STIM HORMONE: CPT

## 2025-02-01 PROCEDURE — 84702 CHORIONIC GONADOTROPIN TEST: CPT

## 2025-02-01 PROCEDURE — 83002 ASSAY OF GONADOTROPIN (LH): CPT

## 2025-02-01 PROCEDURE — 82627 DEHYDROEPIANDROSTERONE: CPT

## 2025-02-01 PROCEDURE — 83036 HEMOGLOBIN GLYCOSYLATED A1C: CPT

## 2025-02-01 PROCEDURE — 84270 ASSAY OF SEX HORMONE GLOBUL: CPT

## 2025-02-01 PROCEDURE — 84146 ASSAY OF PROLACTIN: CPT

## 2025-02-01 PROCEDURE — 83520 IMMUNOASSAY QUANT NOS NONAB: CPT

## 2025-02-01 PROCEDURE — 36415 COLL VENOUS BLD VENIPUNCTURE: CPT

## 2025-02-01 PROCEDURE — 82670 ASSAY OF TOTAL ESTRADIOL: CPT

## 2025-02-01 PROCEDURE — 84403 ASSAY OF TOTAL TESTOSTERONE: CPT

## 2025-02-01 PROCEDURE — 83001 ASSAY OF GONADOTROPIN (FSH): CPT

## 2025-02-01 PROCEDURE — 82533 TOTAL CORTISOL: CPT

## 2025-02-04 LAB
DHEA-S SERPL-MCNC: 182 UG/DL (ref 98.8–340)
SHBG SERPL-SCNC: 41 NMOL/L (ref 25–122)
TESTOST FREE MFR SERPL: 3.8 PG/ML (ref 0.8–7.4)
TESTOST SERPL-MCNC: 24 NG/DL (ref 8–48)
TESTOSTERONE, BIOAVAILABLE: 8.8 NG/DL (ref 2.2–20.6)

## 2025-02-05 LAB — ANTI-MULLERIAN HORMONE: 4.29 NG/ML (ref 0.4–16.02)

## 2025-02-18 ENCOUNTER — OUTSIDE SERVICES (OUTPATIENT)
Dept: OBGYN | Age: 30
End: 2025-02-18

## 2025-02-18 ENCOUNTER — HOSPITAL ENCOUNTER (OUTPATIENT)
Age: 30
Discharge: HOME | End: 2025-02-20

## 2025-02-18 DIAGNOSIS — N92.6 IRREGULAR MENSES: Primary | ICD-10-CM

## 2025-02-18 DIAGNOSIS — R93.89 THICKENED ENDOMETRIUM: ICD-10-CM

## 2025-02-19 NOTE — OP NOTE
[facility] [address] [place] [address]    Outpatient Post Op Note D/C   Signed    Patient: Yasmeen Mcclelland         MR#: [id number]    : 1995         Acct:[id number]    Age/Sex: 29 / F         ADM Date: 25    Loc: [facility]                 Provider Location:              cc: Kashmir Queen MD; Keenan Miranda DO~    Date of Procedure:   25    Surgeon:   Kashmir Queen MD    Preoperative Diagnosis:   Thickened endometrial ultrasound    Post-Op Diagnosis:   Same, endometrial polyp, final pathology pending    Operative Procedure:   1.  Video hysteroscopy  2.  Hysteroscopic polypectomy  3.  Dilation and curettage    Implants: No    General Anesthesia Type: General    Fluids Replaced:  IVF: 700 mL Crystalloid  Hysteroscopic fluid in: 910 mL. Hysteroscopic fluid out: 575 mL;  Net hysteroscopic fluid balance: -335 mL    Urine Output:  Not measured.    Drains: None.    Intraoperative Medication(s):   Toradol 30 mg intravenously.    Indications for Procedure: 29-year-old W female  1 para 0-1-0-0 who underwent an emergent classical  section at 25 weeks gestation whose  subsequently  of sepsis in the NICU at 11 days of age.  Patient had an uncomplicated postpartum examination on 2024.  Her menstrual cycles have returned in July and were pretty normal, every 28 days, until 2024 when she called the office with concern that her period was a week late which is unusual for her.  Typically her menstrual cycles are on time and predictable.  She has also been tracking her ovulation and the ovulation prediction kits are positive for ovulation.  Her periods, when they come, are 3 days long.  They are heavy on day #1 slowing to normal on day #2.  Her cycles were lighter before her pregnancy.  Since the end of  her periods have spaced to 27 to 39 days apart. An October 15, 2024 in office pelvic ultrasound showed a

## 2025-02-24 LAB — SURGICAL PATHOLOGY REPORT: NORMAL

## (undated) DEVICE — SUTURE PLN GUT SZ 3-0 L27IN ABSRB YELLOWISH TAN L36MM CT-1 842H

## (undated) DEVICE — GLOVE ORANGE PI 7   MSG9070

## (undated) DEVICE — NEPTUNE E-SEP SMOKE EVACUATION PENCIL, COATED, 70MM BLADE, PUSH BUTTON SWITCH: Brand: NEPTUNE E-SEP

## (undated) DEVICE — VACUETTE® TUBE 6 ML Z SERUM CLOT ACTIVATOR 13X100 RED CAP-BLACK RING, NON-RIDGED: Brand: VACUETTE

## (undated) DEVICE — SUTURE VICRYL + SZ 3-0 L36IN ABSRB UD L36MM CT-1 1/2 CIR VCP944H

## (undated) DEVICE — HYPODERMIC SAFETY NEEDLE: Brand: MAGELLAN

## (undated) DEVICE — 1LYRTR 16FR10ML 100%SILI SNAP: Brand: MEDLINE INDUSTRIES, INC.

## (undated) DEVICE — 34" SINGLE PATIENT USE HOVERMATT BREATHABLE: Brand: SINGLE PATIENT USE HOVERMATT

## (undated) DEVICE — CONTAINER,SPEC,PNEUM TUBE,3OZ,STRL PATH: Brand: MEDLINE

## (undated) DEVICE — CESAREAN BIRTH PACK: Brand: MEDLINE INDUSTRIES, INC.

## (undated) DEVICE — SYRINGE MED 10ML LUERLOCK TIP W/O SFTY DISP

## (undated) DEVICE — SUTURE STRATAFIX SYMMETRIC SZ 1 L18IN ABSRB VLT CT1 L36CM SXPP1A404

## (undated) DEVICE — BAG SPECIMEN BIOHAZARD 6IN X 9IN

## (undated) DEVICE — Device: Brand: PORTEX

## (undated) DEVICE — SUTURE CHROMIC GUT SZ 1 L36IN ABSRB BRN L40MM CT 1/2 CIR 915H

## (undated) DEVICE — SOLUTION IRRIG 500ML 0.9% SOD CHLO USP POUR PLAS BTL

## (undated) DEVICE — CONTAINER SPEC 64OZ POLYPR PATH SNAP LOK CAP W/ LID

## (undated) DEVICE — DOUBLE BASIN SET: Brand: MEDLINE INDUSTRIES, INC.

## (undated) DEVICE — APPLICATOR MEDICATED 26 CC SOLUTION HI LT ORNG CHLORAPREP

## (undated) DEVICE — GLOVE ORANGE PI 7 1/2   MSG9075

## (undated) DEVICE — 4-PORT MANIFOLD: Brand: NEPTUNE 2

## (undated) DEVICE — SUTURE CHROMIC GUT SZ 2-0 L36IN ABSRB BRN L36MM CT-1 1/2 923H

## (undated) DEVICE — SUTURE MNCRYL STRATAFIX PS 4-0 30CM

## (undated) DEVICE — SUTURE VICRYL + SZ 3-0 L27IN ABSRB UD L26MM SH 1/2 CIR VCP416H